# Patient Record
Sex: FEMALE | Race: BLACK OR AFRICAN AMERICAN | NOT HISPANIC OR LATINO | Employment: PART TIME | URBAN - METROPOLITAN AREA
[De-identification: names, ages, dates, MRNs, and addresses within clinical notes are randomized per-mention and may not be internally consistent; named-entity substitution may affect disease eponyms.]

---

## 2018-04-01 ENCOUNTER — APPOINTMENT (EMERGENCY)
Dept: RADIOLOGY | Facility: HOSPITAL | Age: 33
End: 2018-04-01
Payer: COMMERCIAL

## 2018-04-01 ENCOUNTER — HOSPITAL ENCOUNTER (EMERGENCY)
Facility: HOSPITAL | Age: 33
Discharge: HOME | End: 2018-04-01
Attending: EMERGENCY MEDICINE | Admitting: EMERGENCY MEDICINE
Payer: COMMERCIAL

## 2018-04-01 VITALS
WEIGHT: 105 LBS | RESPIRATION RATE: 16 BRPM | TEMPERATURE: 98.9 F | OXYGEN SATURATION: 99 % | DIASTOLIC BLOOD PRESSURE: 64 MMHG | HEART RATE: 83 BPM | SYSTOLIC BLOOD PRESSURE: 127 MMHG

## 2018-04-01 DIAGNOSIS — M54.6 THORACIC BACK PAIN: Primary | ICD-10-CM

## 2018-04-01 DIAGNOSIS — S80.12XA CONTUSION OF LEFT LOWER EXTREMITY, INITIAL ENCOUNTER: ICD-10-CM

## 2018-04-01 DIAGNOSIS — S40.811A ABRASION OF RIGHT UPPER EXTREMITY, INITIAL ENCOUNTER: ICD-10-CM

## 2018-04-01 DIAGNOSIS — S01.511A LIP LACERATION, INITIAL ENCOUNTER: ICD-10-CM

## 2018-04-01 LAB
BACTERIA UR QL AUTO: ABNORMAL /HPF
BILIRUB UR QL STRIP: ABNORMAL
CLARITY UR: CLEAR
COLOR UR: ABNORMAL
EXT PREG TEST URINE: NEGATIVE
GLUCOSE UR STRIP-MCNC: NEGATIVE MG/DL
HGB UR QL STRIP.AUTO: NEGATIVE
KETONES UR STRIP-MCNC: ABNORMAL MG/DL
LEUKOCYTE ESTERASE UR QL STRIP: NEGATIVE
MUCOUS THREADS UR QL AUTO: ABNORMAL
NITRITE UR QL STRIP: NEGATIVE
NON-SQ EPI CELLS URNS QL MICRO: ABNORMAL /HPF
PH UR STRIP.AUTO: 5.5 [PH] (ref 4.5–8)
PROT UR STRIP-MCNC: ABNORMAL MG/DL
RBC #/AREA URNS AUTO: ABNORMAL /HPF
SP GR UR STRIP.AUTO: 1.02 (ref 1–1.03)
UROBILINOGEN UR QL STRIP.AUTO: 1 E.U./DL
WBC #/AREA URNS AUTO: ABNORMAL /HPF

## 2018-04-01 PROCEDURE — 81025 URINE PREGNANCY TEST: CPT | Performed by: EMERGENCY MEDICINE

## 2018-04-01 PROCEDURE — 71101 X-RAY EXAM UNILAT RIBS/CHEST: CPT

## 2018-04-01 PROCEDURE — 99284 EMERGENCY DEPT VISIT MOD MDM: CPT

## 2018-04-01 PROCEDURE — 81001 URINALYSIS AUTO W/SCOPE: CPT

## 2018-04-01 PROCEDURE — 81002 URINALYSIS NONAUTO W/O SCOPE: CPT | Performed by: EMERGENCY MEDICINE

## 2018-04-01 PROCEDURE — 90715 TDAP VACCINE 7 YRS/> IM: CPT | Performed by: EMERGENCY MEDICINE

## 2018-04-01 PROCEDURE — 90471 IMMUNIZATION ADMIN: CPT

## 2018-04-01 RX ORDER — NAPROXEN 500 MG/1
500 TABLET ORAL 2 TIMES DAILY PRN
Qty: 14 TABLET | Refills: 0 | Status: SHIPPED | OUTPATIENT
Start: 2018-04-01 | End: 2018-07-25

## 2018-04-01 RX ORDER — NAPROXEN 250 MG/1
500 TABLET ORAL ONCE
Status: COMPLETED | OUTPATIENT
Start: 2018-04-01 | End: 2018-04-01

## 2018-04-01 RX ADMIN — TETANUS TOXOID, REDUCED DIPHTHERIA TOXOID AND ACELLULAR PERTUSSIS VACCINE, ADSORBED 0.5 ML: 5; 2.5; 8; 8; 2.5 SUSPENSION INTRAMUSCULAR at 20:50

## 2018-04-01 RX ADMIN — NAPROXEN 500 MG: 250 TABLET ORAL at 20:17
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
## 2018-04-02 NOTE — DISCHARGE INSTRUCTIONS
Back Pain   WHAT YOU NEED TO KNOW:   Back pain is common  It can be caused by many conditions, such as arthritis or the breakdown of spinal discs  Your risk for back pain is increased by injuries, lack of activity, or repeated bending and twisting  You may feel sore or stiff on one or both sides of your back  The pain may spread to your buttocks or thighs  DISCHARGE INSTRUCTIONS:   Medicines:   · NSAIDs  help decrease swelling and pain  This medicine is available with or without a doctor's order  NSAIDs can cause stomach bleeding or kidney problems in certain people  If you take blood thinner medicine, always ask your healthcare provider if NSAIDs are safe for you  Always read the medicine label and follow directions  · Acetaminophen  decreases pain  It is available without a doctor's order  Ask how much to take and how often to take it  Follow directions  Acetaminophen can cause liver damage if not taken correctly  · Prescription pain medicine  may be given  Ask your healthcare provider how to take this medicine safely  · Take your medicine as directed  Contact your healthcare provider if you think your medicine is not helping or if you have side effects  Tell him or her if you are allergic to any medicine  Keep a list of the medicines, vitamins, and herbs you take  Include the amounts, and when and why you take them  Bring the list or the pill bottles to follow-up visits  Carry your medicine list with you in case of an emergency  Follow up with your healthcare provider in 2 weeks, or as directed:  Write down your questions so you remember to ask them during your visits  How to manage your back pain:   · Apply ice  on your back or affected area for 15 to 20 minutes every hour or as directed  Use an ice pack, or put crushed ice in a plastic bag  Cover it with a towel  Ice helps prevent tissue damage and decreases pain      · Apply heat  on your back or affected area for 20 to 30 minutes every 2 hours for as many days as directed  Heat helps decrease pain and muscle spasms  · Stay active  as much as you can without causing more pain  Bed rest could make your back pain worse  Avoid heavy lifting until your pain is gone  Return to the emergency department if:   · You have pain, numbness, or weakness in one or both legs  · Your pain becomes so severe that you cannot walk  · You cannot control your urine or bowel movements  · You have severe back pain with chest pain  · You have severe back pain, nausea, and vomiting  · You have severe back pain that spreads to your side or genital area  Contact your healthcare provider if:   · You have back pain that does not get better with rest and pain medicine  · You have a fever  · You have pain that worsens when you are on your back or when you rest     · You have pain that worsens when you cough or sneeze  · You lose weight without trying  · You have questions or concerns about your condition or care  © 2017 [address] Information is for End User's use only and may not be sold, redistributed or otherwise used for commercial purposes  All illustrations and images included in CareNotes® are the copyrighted property of A D A M , Inc  or Nithin Alarcon  The above information is an  only  It is not intended as medical advice for individual conditions or treatments  Talk to your doctor, nurse or pharmacist before following any medical regimen to see if it is safe and effective for you  Abrasion   WHAT YOU NEED TO KNOW:   An abrasion is a scrape on your skin  It happens when your skin rubs against a rough surface  Some examples of an abrasion include rug burn, a skinned elbow, or road rash  Abrasions can be many shapes and sizes  The wound may hurt, bleed, bruise, or swell     DISCHARGE INSTRUCTIONS:   Return to the emergency department if:   · The bleeding does not stop after 10 minutes of firm pressure  · You cannot rinse one or more foreign objects out of your wound  · You have red streaks on your skin coming from your wound  Contact your healthcare provider if:   · You have a fever or chills  · Your abrasion is red, warm, swollen, or draining pus  · You have questions or concerns about your condition or care  Care for your abrasion:   · Wash your hands and dry them with a clean towel  · Press a clean cloth against your wound to stop any bleeding  · Rinse your wound with a lot of clean water  Do not use harsh soap, alcohol, or iodine solutions  · Use a clean, wet cloth to remove any objects, such as small pieces of rocks or dirt  · Rub antibiotic ointment on your wound  This may help prevent infection and help your wound heal     · Cover the wound with a non-stick bandage  Change the bandage daily, and if gets wet or dirty  Follow up with your healthcare provider as directed:  Write down your questions so you remember to ask them during your visits  © 2017 [facility] [facility] Information is for End User's use only and may not be sold, redistributed or otherwise used for commercial purposes  All illustrations and images included in CareNotes® are the copyrighted property of A D A M , Inc  or [employer]  The above information is an  only  It is not intended as medical advice for individual conditions or treatments  Talk to your doctor, nurse or pharmacist before following any medical regimen to see if it is safe and effective for you  Contusion in Adults   WHAT YOU NEED TO KNOW:   A contusion is a bruise that appears on your skin after an injury  A bruise happens when small blood vessels tear but skin does not  When blood vessels tear, blood leaks into nearby tissue, such as soft tissue or muscle  DISCHARGE INSTRUCTIONS:   Return to the emergency department if:   · You have new trouble moving the injured area      · You have tingling or numbness in or near the injured area  · Your hand or foot below the bruise gets cold or turns pale  Contact your healthcare provider if:   · You find a new lump in the injured area  · Your symptoms do not improve with treatment after 4 to 5 days  · You have questions or concerns about your condition or care  Medicines: You may need any of the following:  · NSAIDs  help decrease swelling and pain or fever  This medicine is available with or without a doctor's order  NSAIDs can cause stomach bleeding or kidney problems in certain people  If you take blood thinner medicine, always ask your healthcare provider if NSAIDs are safe for you  Always read the medicine label and follow directions  · Prescription pain medicine  may be given  Do not wait until the pain is severe before you take your medicine  · Take your medicine as directed  Contact your healthcare provider if you think your medicine is not helping or if you have side effects  Tell him of her if you are allergic to any medicine  Keep a list of the medicines, vitamins, and herbs you take  Include the amounts, and when and why you take them  Bring the list or the pill bottles to follow-up visits  Carry your medicine list with you in case of an emergency  Follow up with your healthcare provider as directed: You may need to return within a week to check your injury again  Write down your questions so you remember to ask them during your visits  Help a contusion heal:   · Rest the injured area  or use it less than usual  If you bruised your leg or foot, you may need crutches or a cane to help you walk  This will help you keep weight off your injured body part  · Apply ice  to decrease swelling and pain  Ice may also help prevent tissue damage  Use an ice pack, or put crushed ice in a plastic bag  Cover it with a towel and place it on your bruise for 15 to 20 minutes every hour or as directed      · Use compression  to support the area and decrease swelling  Wrap an elastic bandage around the area over the bruised muscle  Make sure the bandage is not too tight  You should be able to fit 1 finger between the bandage and your skin  · Elevate (raise) your injured body part  above the level of your heart to help decrease pain and swelling  Use pillows, blankets, or rolled towels to elevate the area as often as you can  · Do not drink alcohol  as directed  Alcohol may slow healing  · Do not stretch injured muscles  right after your injury  Ask your healthcare provider when and how you may safely stretch after your injury  Gentle stretches can help increase your flexibility  · Do not massage the area or put heating pads  on the bruise right after your injury  Heat and massage may slow healing  Your healthcare provider may tell you to apply heat after several days  At that time, heat will start to help the injury heal   Prevent another contusion:   · Stretch and warm up before you play sports or exercise  · Wear protective gear when you play sports  Examples are shin guards and padding  · If you begin a new physical activity, start slowly to give your body a chance to adjust   © 2017 [address] [facility] Information is for End User's use only and may not be sold, redistributed or otherwise used for commercial purposes  All illustrations and images included in CareNotes® are the copyrighted property of A D A M , Inc  or [employer]  The above information is an  only  It is not intended as medical advice for individual conditions or treatments  Talk to your doctor, nurse or pharmacist before following any medical regimen to see if it is safe and effective for you

## 2018-07-25 ENCOUNTER — HOSPITAL ENCOUNTER (INPATIENT)
Facility: HOSPITAL | Age: 33
LOS: 6 days | Discharge: HOME | DRG: 751 | End: 2018-07-31
Attending: PSYCHIATRY & NEUROLOGY | Admitting: PSYCHIATRY & NEUROLOGY
Payer: COMMERCIAL

## 2018-07-25 ENCOUNTER — HOSPITAL ENCOUNTER (EMERGENCY)
Facility: HOSPITAL | Age: 33
End: 2018-07-25
Attending: EMERGENCY MEDICINE | Admitting: EMERGENCY MEDICINE
Payer: COMMERCIAL

## 2018-07-25 VITALS
DIASTOLIC BLOOD PRESSURE: 79 MMHG | RESPIRATION RATE: 16 BRPM | SYSTOLIC BLOOD PRESSURE: 125 MMHG | HEART RATE: 86 BPM | TEMPERATURE: 97 F | OXYGEN SATURATION: 99 %

## 2018-07-25 DIAGNOSIS — Z00.8 MEDICAL CLEARANCE FOR PSYCHIATRIC ADMISSION: Primary | ICD-10-CM

## 2018-07-25 DIAGNOSIS — F33.2 SEVERE EPISODE OF RECURRENT MAJOR DEPRESSIVE DISORDER, WITHOUT PSYCHOTIC FEATURES (HCC): Primary | ICD-10-CM

## 2018-07-25 DIAGNOSIS — F41.1 GENERALIZED ANXIETY DISORDER: ICD-10-CM

## 2018-07-25 DIAGNOSIS — F31.9 BIPOLAR DISORDER (HCC): Primary | ICD-10-CM

## 2018-07-25 DIAGNOSIS — Z00.8 MEDICAL CLEARANCE FOR PSYCHIATRIC ADMISSION: ICD-10-CM

## 2018-07-25 LAB
AMPHETAMINES SERPL QL SCN: NEGATIVE
BARBITURATES UR QL: NEGATIVE
BENZODIAZ UR QL: NEGATIVE
COCAINE UR QL: POSITIVE
ETHANOL EXG-MCNC: 0.01 MG/DL
EXT PREG TEST URINE: NEGATIVE
METHADONE UR QL: NEGATIVE
OPIATES UR QL SCN: NEGATIVE
PCP UR QL: NEGATIVE
THC UR QL: POSITIVE

## 2018-07-25 PROCEDURE — 80307 DRUG TEST PRSMV CHEM ANLYZR: CPT

## 2018-07-25 PROCEDURE — 82075 ASSAY OF BREATH ETHANOL: CPT

## 2018-07-25 PROCEDURE — 81025 URINE PREGNANCY TEST: CPT

## 2018-07-25 PROCEDURE — 99285 EMERGENCY DEPT VISIT HI MDM: CPT

## 2018-07-25 RX ORDER — MAGNESIUM HYDROXIDE/ALUMINUM HYDROXICE/SIMETHICONE 120; 1200; 1200 MG/30ML; MG/30ML; MG/30ML
30 SUSPENSION ORAL EVERY 4 HOURS PRN
Status: CANCELLED | OUTPATIENT
Start: 2018-07-25 | End: 2019-07-25

## 2018-07-25 RX ORDER — IBUPROFEN 400 MG/1
800 TABLET ORAL EVERY 8 HOURS PRN
Status: CANCELLED | OUTPATIENT
Start: 2018-07-25 | End: 2019-07-25

## 2018-07-25 RX ORDER — LORAZEPAM 1 MG/1
1 TABLET ORAL ONCE
Status: COMPLETED | OUTPATIENT
Start: 2018-07-25 | End: 2018-07-25

## 2018-07-25 RX ORDER — ACETAMINOPHEN 325 MG/1
650 TABLET ORAL EVERY 6 HOURS PRN
Status: CANCELLED | OUTPATIENT
Start: 2018-07-25 | End: 2019-07-25

## 2018-07-25 RX ORDER — TRAZODONE HYDROCHLORIDE 50 MG/1
50 TABLET ORAL
Status: CANCELLED | OUTPATIENT
Start: 2018-07-25 | End: 2019-07-25

## 2018-07-25 RX ORDER — BENZTROPINE MESYLATE 1 MG/ML
1 INJECTION INTRAMUSCULAR; INTRAVENOUS EVERY 6 HOURS PRN
Status: CANCELLED | OUTPATIENT
Start: 2018-07-25 | End: 2019-07-25

## 2018-07-25 RX ORDER — IBUPROFEN 600 MG/1
600 TABLET ORAL EVERY 8 HOURS PRN
Status: CANCELLED | OUTPATIENT
Start: 2018-07-25 | End: 2019-07-25

## 2018-07-25 RX ORDER — RISPERIDONE 1 MG/1
1 TABLET, ORALLY DISINTEGRATING ORAL EVERY 6 HOURS PRN
Status: CANCELLED | OUTPATIENT
Start: 2018-07-25 | End: 2019-07-25

## 2018-07-25 RX ORDER — HYDROXYZINE HYDROCHLORIDE 25 MG/1
25 TABLET, FILM COATED ORAL EVERY 6 HOURS PRN
Status: CANCELLED | OUTPATIENT
Start: 2018-07-25 | End: 2019-07-25

## 2018-07-25 RX ORDER — BENZTROPINE MESYLATE 1 MG/1
1 TABLET ORAL EVERY 6 HOURS PRN
Status: CANCELLED | OUTPATIENT
Start: 2018-07-25 | End: 2019-07-25

## 2018-07-25 RX ADMIN — LORAZEPAM 1 MG: 1 TABLET ORAL at 15:54

## 2018-07-25 RX ADMIN — LORAZEPAM 1 MG: 1 TABLET ORAL at 20:45

## 2018-07-25 NOTE — LETTER
[facility]  [address] [address]  Dept: [phone]      EMTALA TRANSFER CONSENT    NAME Amauri Li                                         1985                              MRN [id number]    I have been informed of my rights regarding examination, treatment, and transfer   by Dr Saurav Hogan DO    Benefits: Specialized equipment and/or services available at the receiving facility (Include comment)________________________    Risks: Potential for delay in receiving treatment, Increased discomfort during transfer      Consent for Transfer:  I acknowledge that my medical condition has been evaluated and explained to me by the emergency department physician or other qualified medical person and/or my attending physician, who has recommended that I be transferred to the service of  Accepting Physician: DR Becky Mejía at [address] [place] [address] Name, [address] : Adri Bingham ([address] [place] [address]  The above potential benefits of such transfer, the potential risks associated with such transfer, and the probable risks of not being transferred have been explained to me, and I fully understand them  The doctor has explained that, in my case, the benefits of transfer outweigh the risks  I agree to be transferred  I authorize the performance of emergency medical procedures and treatments upon me in both transit and upon arrival at the receiving facility  Additionally, I authorize the release of any and all medical records to the receiving facility and request they be transported with me, if possible  I understand that the safest mode of transportation during a medical emergency is an ambulance and that the Hospital advocates the use of this mode of transport   Risks of traveling to the receiving facility by car, including absence of medical control, life sustaining equipment, such as oxygen, and medical personnel has been explained to me and I fully understand them  (MAYA CORRECT BOX BELOW)  Royer Epp ]  I consent to the stated transfer and to be transported by ambulance/helicopter  [  ]  I consent to the stated transfer, but refuse transportation by ambulance and accept full responsibility for my transportation by car  I understand the risks of non-ambulance transfers and I exonerate the Hospital and its staff from any deterioration in my condition that results from this refusal     X___________________________________________    DATE  18  TIME________  Signature of patient or legally responsible individual signing on patient behalf           RELATIONSHIP TO PATIENT_________________________          Provider Certification    NAME Traci Restrepo                                        TRESA 1985                              MRN [id number]    A medical screening exam was performed on the above named patient  Based on the examination:    Condition Necessitating Transfer The encounter diagnosis was Bipolar disorder ([address] )  Patient Condition: The patient has been stabilized such that within reasonable medical probability, no material deterioration of the patient condition or the condition of the unborn child(rishabh) is likely to result from the transfer    Reason for Transfer: Level of Care needed not available at this facility    Transfer Requirements: [facility] ([address]   · Space available and qualified personnel available for treatment as acknowledged by Joyce Mcrae ([facility]) [phone]  · Agreed to accept transfer and to provide appropriate medical treatment as acknowledged by       DR Wan Soliz  · Appropriate medical records of the examination and treatment of the patient are provided at the time of transfer   [facility] [address] _______  · Transfer will be performed by qualified personnel from [address]  and appropriate transfer equipment as required, including the use of necessary and appropriate life support measures  Provider Certification: I have examined the patient and explained the following risks and benefits of being transferred/refusing transfer to the patient/family:  General risk, such as traffic hazards, adverse weather conditions, rough terrain or turbulence, possible failure of equipment (including vehicle or aircraft), or consequences of actions of persons outside the control of the transport personnel      Based on these reasonable risks and benefits to the patient and/or the unborn child(rishabh), and based upon the information available at the time of the patients examination, I certify that the medical benefits reasonably to be expected from the provision of appropriate medical treatments at another medical facility outweigh the increasing risks, if any, to the individuals medical condition, and in the case of labor to the unborn child, from effecting the transfer      X____________________________________________ DATE 07/25/18        TIME_______      ORIGINAL - SEND TO MEDICAL RECORDS   COPY - SEND WITH PATIENT DURING TRANSFER

## 2018-07-25 NOTE — LETTER
Section I - General Information    Name of Patient: Forrest Slaughter                 : 1985    Medicare #:____________________  Transport Date: 18 (PCS is valid for round trips on this date and for all repetitive trips in the 60-day range as noted below )  Origin: [place] [id number]                                                         [facility] ________________________________________________  Is the pt's stay covered under Medicare Part A (PPS/DRG)     (_) YES  (X) NO  Closest appropriate facility? (X) YES  (_) NO  If no, why is transport to more distant facility required?________________________  If hosp-hosp transfer, describe services needed at 2nd facility not available at 1st facility? _________________________________  If hospice pt, is this transport related to pt's terminal illness? (_) YES (X) NO Describe____________________________________    Section II - Medical Necessity Questionnaire  Ambulance transportation is medically necessary only if other means of transport are contraindicated or would be potentially harmful to the patient  To meet this requirement, the patient must either be "bed confined" or suffer from a condition such that transport by means other than ambulance is contraindicated by the patient's condition   The following questions must be answered by the medical professional signing below for this form to be valid:    1)  Describe the MEDICAL CONDITION (physical and/or mental) of this patient AT [address] [place] [address] that requires the patient to be transported in an ambulance and why transport by other means is contraindicated by the patient's condition:__________________________________________________________________________________________________    2) Is the patient "bed confined" as defined below?     (_) YES  (X) NO  To be "be confined" the patient must satisfy all three of the following conditions: (1) unable to get up from bed without Assistance; AND (2) unable to ambulate; AND (3) unable to sit in a chair or wheelchair  3) Can this patient safely be transported by car or wheelchair Fredie Able (i e , seated during transport without a medical attendant or monitoring)?   (_) YES  (X) NO    4) In addition to completing questions 1-3 above, please check any of the following conditions that apply*:  *Note: supporting documentation for any boxes checked must be maintained in the patient's medical records  (_)Contractures   (_)Non-Healed Fractures  (_)Patient is confused (_)Patient is comatose (_)Moderate/severe pain on movement (X)Danger to self/others  (_)IV meds/fluids required (_)Patient is combative(_)Need or possible need for restraints (_)DVT requires elevation of lower extremity  (_)Medical attendant required (_)Requires oxygen-unable to self administer (_)Special handling/isolation/infection control precautions required (_)Unable to tolerate seated position for time needed to transport (_)Hemodynamic monitoring required en route (_)Unable to sit in a chair or wheelchair due to decubitus ulcers or other wounds (_)Cardiac monitoring required en route (_)Morbid obesity requires additional personnel/equipment to safely handle patient (_)Orthopedic device (backboard, halo, pins, traction, brace, wedge, etc,) requiring special handling during transport (_)Other(specify)_______________________________________________    Section III - Signature of Physician or Healthcare Professional  I certify that the above information is true and correct based on my evaluation of this patient, and represent that the patient requires transport by ambulance and that other forms of transport are contraindicated   I understand that this information will be used by the Centers for Medicare and Medicaid Services (CMS) to support the determination of medical necessity for ambulance services, and I represent that I have personal knowledge of the patient's condition at time of transport  (_) If this box is checked, I also certify that the patient is physically or mentally incapable of signing the ambulance service's claim and that the institution with which I am affiliated has furnished care, services, or assistance to the patient  My signature below is made on behalf of the patient pursuant to 42 CFR §424 36(b)(4)  In accordance with 42 CFR §424 37, the specific reason(s) that the patient is physically or mentally incapable of signing the claim form is as follows: _________________________________________________________________________________________________________      Signature of Physician* or Healthcare Professional______________________________________________________________  Signature Date 07/25/18 (For scheduled repetitive transports, this form is not valid for transports performed more than 60 days after this date)    Printed Name & Credentials of Physician or Healthcare Professional (MD, DO, RN, etc )________________________________  *Form must be signed by patient's attending physician for scheduled, repetitive transports   For non-repetitive, unscheduled ambulance transports, if unable to obtain the signature of the attending physician, any of the following may sign (choose appropriate option below)  (_) Physician Assistant (_)  Clinical Nurse Specialist (_)  Registered Nurse  (_)  Nurse Practitioner  (X) Discharge Planner

## 2018-07-25 NOTE — ED PROVIDER NOTES
History  Chief Complaint   Patient presents with    Psychiatric Evaluation     Reports history of depression and bipolar  Off medications for approx 5 years  Increased depression  Denies AH or VH  Reports SI with no plan  Denies HI but states she has been having a hard time controlling her anger "and I feel like I wanna take it to the next leve"  Calm and cooperative in triage but tearful  Pt  Has a hx of bipolar disorder , she's been off of meds for 5 years now  Lately she's felt more depressed, not sleeping, not eating  She had a breakup with her boyfriend and moved out 2 weeks ago  She drinks alcohol and smokes weed often - last drink was last night   +suicidal -no definite plan  She said that she's tried to overdose in the past              Prior to Admission Medications   Prescriptions Last Dose Informant Patient Reported? Taking?   naproxen (NAPROSYN) 500 mg tablet   No No   Sig: Take 1 tablet (500 mg total) by mouth 2 (two) times a day as needed for moderate pain for up to 7 days      Facility-Administered Medications: None       Past Medical History:   Diagnosis Date    Anxiety     B[facility] disorder ([facility] )     Depression        Past Surgical History:   Procedure Laterality Date     SECTION      ECTOPIC PREGNANCY SURGERY      ECTOPIC PREGNANCY SURGERY Left        History reviewed  No pertinent family history  I have reviewed and agree with the history as documented  Social History   Substance Use Topics    Smoking status: Current Every Day Smoker     Packs/day: 0 25     Types: Cigarettes    Smokeless tobacco: Never Used    Alcohol use Yes      Comment: reports increased ETOH use        Review of Systems   Constitutional: Negative for appetite change, fatigue and fever  HENT: Negative for sore throat  Respiratory: Negative for cough, shortness of breath and wheezing  Cardiovascular: Negative for chest pain and leg swelling     Gastrointestinal: Negative for abdominal pain, diarrhea and vomiting  Genitourinary: Negative for dysuria and flank pain  Musculoskeletal: Negative for back pain and neck pain  Skin: Negative for rash  Neurological: Negative for syncope, weakness and headaches  Psychiatric/Behavioral:        +SI , no definite plan       Physical Exam  Physical Exam   Constitutional: She is oriented to person, place, and time  She appears well-developed and well-nourished  HENT:   Head: Normocephalic and atraumatic  Neck: Normal range of motion  Neck supple  Cardiovascular: Normal rate and regular rhythm  Pulmonary/Chest: Effort normal and breath sounds normal    Abdominal: Soft  There is no tenderness  Musculoskeletal: Normal range of motion  Neurological: She is alert and oriented to person, place, and time  Skin: Skin is warm and dry  Psychiatric:   +SI without a definite plan  No HI  Pt  States that she feels very angry  +anxious  Nursing note and vitals reviewed  Vital Signs  ED Triage Vitals [07/25/18 1423]   Temperature Pulse Respirations Blood Pressure SpO2   (!) 97 °F (36 1 °C) 82 17 (!) 176/96 97 %      Temp src Heart Rate Source Patient Position - Orthostatic VS BP Location FiO2 (%)   -- -- Sitting -- --      Pain Score       No Pain           Vitals:    07/25/18 1423   BP: (!) 176/96   Pulse: 82   Patient Position - Orthostatic VS: Sitting       Visual Acuity      ED Medications  Medications   LORazepam (ATIVAN) tablet 1 mg (not administered)       Diagnostic Studies  Results Reviewed     Procedure Component Value Units Date/Time    Rapid drug screen, urine [[id number]] Collected:  07/25/18 1525    Lab Status:   In process Specimen:  Urine from Urine, Clean Catch Updated:  07/25/18 1527    POCT pregnancy, urine [[id number]]  (Normal) Resulted:  07/25/18 1525    Lab Status:  Final result Specimen:  Urine Updated:  07/25/18 1525     EXT PREG TEST UR (Ref: Negative) NEGATIVE    POCT alcohol breath test [[id number]]  (Normal) Resulted: 07/25/18 1523    Lab Status:  Final result Updated:  07/25/18 1523     EXTBreath Alcohol 0 01                 No orders to display              Procedures  Procedures       Phone Contacts  ED Phone Contact    ED Course                               MDM  Number of Diagnoses or Management Options  Risk of Complications, Morbidity, and/or Mortality  Presenting problems: moderate      CritCare Time    Disposition  Final diagnoses:   Bipolar disorder ([facility] [address] )     Time reflects when diagnosis was documented in both MDM as applicable and the Disposition within this note     Time User Action Codes Description Comment    7/25/2018  3:40 PM Jake Pierce Add [F31 9] Bipolar disorder [facility])       ED Disposition     None      Follow-up Information    None         Patient's Medications   Discharge Prescriptions    No medications on file     No discharge procedures on file      ED Provider  Electronically Signed by           Kell Moreira MD  07/25/18 2223

## 2018-07-25 NOTE — ED NOTES
Patient reports she is not currently taking any prescribed medication        Lehman Hamman, RN  07/25/18 9858

## 2018-07-26 PROBLEM — F10.90 ALCOHOL USE DISORDER: Status: ACTIVE | Noted: 2018-07-26

## 2018-07-26 PROBLEM — F41.1 GENERALIZED ANXIETY DISORDER: Status: ACTIVE | Noted: 2018-07-26

## 2018-07-26 PROBLEM — F12.10 CANNABIS ABUSE: Status: ACTIVE | Noted: 2018-07-26

## 2018-07-26 PROBLEM — IMO0002 ALCOHOL USE DISORDER: Status: ACTIVE | Noted: 2018-07-26

## 2018-07-26 PROBLEM — F33.2 SEVERE EPISODE OF RECURRENT MAJOR DEPRESSIVE DISORDER, WITHOUT PSYCHOTIC FEATURES (HCC): Status: ACTIVE | Noted: 2018-07-26

## 2018-07-26 LAB
AMPHETAMINES SERPL QL SCN: NEGATIVE
BARBITURATES UR QL: NEGATIVE
BENZODIAZ UR QL: NEGATIVE
COCAINE UR QL: POSITIVE
METHADONE UR QL: NEGATIVE
OPIATES UR QL SCN: NEGATIVE
PCP UR QL: NEGATIVE
THC UR QL: POSITIVE

## 2018-07-26 PROCEDURE — 80307 DRUG TEST PRSMV CHEM ANLYZR: CPT | Performed by: PSYCHIATRY & NEUROLOGY

## 2018-07-26 PROCEDURE — 99223 1ST HOSP IP/OBS HIGH 75: CPT | Performed by: PSYCHIATRY & NEUROLOGY

## 2018-07-26 PROCEDURE — 99251 PR INITL INPATIENT CONSULT NEW/ESTAB PT 20 MIN: CPT | Performed by: PHYSICIAN ASSISTANT

## 2018-07-26 RX ORDER — LORAZEPAM 0.5 MG/1
0.5 TABLET ORAL 2 TIMES DAILY
Status: DISCONTINUED | OUTPATIENT
Start: 2018-07-26 | End: 2018-07-31 | Stop reason: HOSPADM

## 2018-07-26 RX ORDER — MIRTAZAPINE 15 MG/1
15 TABLET, FILM COATED ORAL
Status: DISCONTINUED | OUTPATIENT
Start: 2018-07-26 | End: 2018-07-27

## 2018-07-26 RX ORDER — IBUPROFEN 600 MG/1
600 TABLET ORAL EVERY 8 HOURS PRN
Status: DISCONTINUED | OUTPATIENT
Start: 2018-07-26 | End: 2018-07-31 | Stop reason: HOSPADM

## 2018-07-26 RX ORDER — IBUPROFEN 800 MG/1
800 TABLET ORAL EVERY 8 HOURS PRN
Status: DISCONTINUED | OUTPATIENT
Start: 2018-07-26 | End: 2018-07-31 | Stop reason: HOSPADM

## 2018-07-26 RX ORDER — HYDROXYZINE HYDROCHLORIDE 25 MG/1
25 TABLET, FILM COATED ORAL EVERY 6 HOURS PRN
Status: DISCONTINUED | OUTPATIENT
Start: 2018-07-26 | End: 2018-07-31 | Stop reason: HOSPADM

## 2018-07-26 RX ORDER — MAGNESIUM HYDROXIDE/ALUMINUM HYDROXICE/SIMETHICONE 120; 1200; 1200 MG/30ML; MG/30ML; MG/30ML
30 SUSPENSION ORAL EVERY 4 HOURS PRN
Status: DISCONTINUED | OUTPATIENT
Start: 2018-07-26 | End: 2018-07-31 | Stop reason: HOSPADM

## 2018-07-26 RX ORDER — TRAZODONE HYDROCHLORIDE 50 MG/1
50 TABLET ORAL
Status: DISCONTINUED | OUTPATIENT
Start: 2018-07-26 | End: 2018-07-31 | Stop reason: HOSPADM

## 2018-07-26 RX ORDER — ACETAMINOPHEN 325 MG/1
650 TABLET ORAL EVERY 6 HOURS PRN
Status: DISCONTINUED | OUTPATIENT
Start: 2018-07-26 | End: 2018-07-31 | Stop reason: HOSPADM

## 2018-07-26 RX ORDER — BENZTROPINE MESYLATE 1 MG/ML
1 INJECTION INTRAMUSCULAR; INTRAVENOUS EVERY 6 HOURS PRN
Status: DISCONTINUED | OUTPATIENT
Start: 2018-07-26 | End: 2018-07-31 | Stop reason: HOSPADM

## 2018-07-26 RX ORDER — BENZTROPINE MESYLATE 1 MG/1
1 TABLET ORAL EVERY 6 HOURS PRN
Status: DISCONTINUED | OUTPATIENT
Start: 2018-07-26 | End: 2018-07-31 | Stop reason: HOSPADM

## 2018-07-26 RX ORDER — RISPERIDONE 1 MG/1
1 TABLET, ORALLY DISINTEGRATING ORAL EVERY 6 HOURS PRN
Status: DISCONTINUED | OUTPATIENT
Start: 2018-07-26 | End: 2018-07-31 | Stop reason: HOSPADM

## 2018-07-26 RX ADMIN — LORAZEPAM 0.5 MG: 0.5 TABLET ORAL at 10:52

## 2018-07-26 RX ADMIN — LORAZEPAM 0.5 MG: 0.5 TABLET ORAL at 17:18

## 2018-07-26 RX ADMIN — MIRTAZAPINE 15 MG: 15 TABLET, FILM COATED ORAL at 21:15

## 2018-07-26 RX ADMIN — TRAZODONE HYDROCHLORIDE 50 MG: 50 TABLET ORAL at 00:17

## 2018-07-26 NOTE — ED NOTES
CW EVS'd Pt and per EVS pt has [facility] as her primary insurance      [address] [place] [address]
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
## 2018-07-26 NOTE — ED NOTES
CW started bed search  CW called [facility] to inquire about [facility] bed status, [facility] informed me that pt clinical can be presented to [facility] for possible admission, 201 has been faxed        [address] [place] [address]

## 2018-07-26 NOTE — ED NOTES
Pt personal belongings and paperwork provided to EMS crew and pt prepared for transport to [facility]        Katerina Negron RN  07/25/18 6408

## 2018-07-26 NOTE — ED NOTES
Pt [employer] credit card provided to [facility] from pts locker with personal belongings per pt request       Pt requesting more ginger ale and "something for her nerves", will notify primary provider        Mauro Gonzales RN  07/25/18 2033
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
## 2018-07-26 NOTE — ED NOTES
Patient is accepted at [address] [facility] ([facility])  Patient is accepted by Dr ROBBI Wiley  per Dayna Carpenter (CW)    Transportation is arranged with [facility] is scheduled for 07/25/2018 @1909   Patient may go to the floor at [address] [facility] (2[facility]ST)         Nurse report is to be called to [phone] prior to patient transfer      [address] [place] [address]
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
## 2018-07-26 NOTE — PLAN OF CARE
Problem: SELF HARM/SUICIDALITY  Goal: Will have no self-injury during hospital stay  INTERVENTIONS:  - Q 15 MINUTES: Routine safety checks  - Q WAKING SHIFT & PRN: Assess risk to determine if routine checks are adequate to maintain patient safety  - Encourage patient to participate actively in care by formulating a plan to combat response to suicidal ideation, identify supports and resources   Outcome: Progressing      Problem: DEPRESSION  Goal: Will be euthymic at discharge  INTERVENTIONS:  - Administer medication as ordered  - Provide emotional support via 1:1 interaction with staff  - Encourage involvement in milieu/groups/activities  - Monitor for social isolation   Outcome: Progressing      Problem: ANXIETY  Goal: Will report anxiety at manageable levels  INTERVENTIONS:  - Administer medication as ordered  - Teach and encourage coping skills  - Provide emotional support  - Assess patient/family for anxiety and ability to cope   Outcome: Progressing    Goal: By discharge: Patient will verbalize 2 strategies to deal with anxiety  Interventions:  - Identify any obvious source/trigger to anxiety  - Staff will assist patient in applying identified coping technique/skills  - Encourage attendance of scheduled groups and activities   Outcome: Progressing      Problem: SUBSTANCE USE/ABUSE  Goal: Will have no detox symptoms and will verbalize plan for changing substance-related behavior  INTERVENTIONS:  - Monitor physical status and assess for symptoms of withdrawal  - Administer medication as ordered  - Provide emotional support with 1 on 1 interaction with staff  - Encourage recovery focused program/ addiction education  - Assess for verbalization of changing behaviors related to substance abuse  - Initiate consults and referrals as appropriate (Case Management, Spiritual Care, etc )   Outcome: Progressing    Goal: By discharge, will develop insight into their chemical dependency and sustain motivation to continue in recovery  INTERVENTIONS:  - Attends all daily group sessions and scheduled AA groups  - Actively practices coping skills through participation in the therapeutic community and adherence to program rules  - Reviews and completes assignments from individual treatment plan  - Assist patient development of understanding of their personal cycle of addiction and relapse triggers   Outcome: Progressing    Goal: By discharge, patient will have ongoing treatment plan addressing chemical dependency  INTERVENTIONS:  - Assist patient with resources and/or appointments for ongoing recovery based living   Outcome: Progressing      Problem: SLEEP DISTURBANCE  Goal: Will exhibit normal sleeping pattern  Interventions:  -  Assess the patients sleep pattern, noting recent changes  - Administer medication as ordered  - Decrease environmental stimuli, including noise, as appropriate during the night  - Encourage the patient to actively participate in unit groups and or exercise during the day to enhance ability to achieve adequate sleep at night  - Assess the patient, in the morning, encouraging a description of sleep experience   Outcome: Progressing      Problem: DISCHARGE PLANNING  Goal: Discharge to home or other facility with appropriate resources  INTERVENTIONS:  - Identify barriers to discharge w/patient and caregiver  - Arrange for needed discharge resources and transportation as appropriate  - Identify discharge learning needs (meds, wound care, etc )  - Arrange for interpretive services to assist at discharge as needed  - Refer to Case Management Department for coordinating discharge planning if the patient needs post-hospital services based on physician/advanced practitioner order or complex needs related to functional status, cognitive ability, or social support system   Outcome: Progressing      Problem: Ineffective Coping  Goal: Participates in unit activities  Interventions:  - Provide therapeutic environment   - Provide required programming   - Redirect inappropriate behaviors    Outcome: Progressing      Problem: Risk for Violence/Aggression Toward Others  Goal: Control angry outbursts  Interventions:  - Monitor patient closely, per order  - Ensure early verbal de-escalation  - Monitor prn medication needs  - Set reasonable/therapeutic limits, outline behavioral expectations, and consequences   - Provide a non-threatening milieu, utilizing the least restrictive interventions    Outcome: Progressing    Goal: Identify appropriate positive anger management techniques  Interventions:  - Offer anger management and coping skills groups   - Staff will provide positive feedback for appropriate anger control   Outcome: Progressing

## 2018-07-26 NOTE — TREATMENT PLAN
TREATMENT PLAN REVIEW - [address] 26 y o  1985 female MRN: [id number]    [address] [place] [address] [facility] [address] Encounter: [id number]          Admit Date/Time:  7/25/2018 11:54 PM    Treatment Team: Attending Provider: Otoniel Malcolm; Consulting Physician: Tiffany Ralph MD; Patient Care Technician: Cesia Celis;  Patient Care Technician: Lane Gutierrez; Medications RN: Gabriela Hagan RN; Occupational Therapy Assistant: CAROL Jaimes; : Lianne Marquez; Registered Nurse: Evan Oseguera RN    Diagnosis: Principal Problem:    Severe episode of recurrent major depressive disorder, without psychotic features ([facility] )  Active Problems:    Cannabis abuse    Generalized anxiety disorder    Alcohol use disorder ([facility] )    Patient Strengths/Assets: cooperative, good past treatment response, motivation for treatment/growth, patient is on a voluntary commitment    Patient Barriers/Limitations: limited support system, low self esteem, substance abuse    Short Term Goals: decrease in depressive symptoms, decrease in anxiety symptoms, decrease in suicidal thoughts    Long Term Goals: improvement in depression, improvement in anxiety, stabilization of mood, free of suicidal thoughts, acceptance of need for psychiatric medications, acceptance of need for psychiatric treatment, acceptance of need for psychiatric follow up after discharge    Progress Towards Goals: starting psychiatric medications as prescribed    Recommended Treatment: medication management, patient medication education, group therapy, milieu therapy, continued Behavioral Health psychiatric evaluation/assessment process    Treatment Frequency: daily medication monitoring, group and milieu therapy daily, monitoring through interdisciplinary rounds, monitoring through weekly patient care conferences    Expected Discharge Date: 5-7 days    Discharge Plan: referral for outpatient medication management with a psychiatrist, referral for outpatient psychotherapy    Treatment Plan Created/Updated By: Irish Nicole

## 2018-07-26 NOTE — ED NOTES
Pt is a 26 y o  female who was brought to the ED with   Chief Complaint   Patient presents with    Psychiatric Evaluation     Reports history of depression and bipolar  Off medications for approx 5 years  Increased depression  Denies AH or VH  Reports SI with no plan  Denies HI but states she has been having a hard time controlling her anger "and I feel like I wanna take it to the next leve"  Calm and cooperative in triage but tearful  Pt brought to the ED with complaints of increased depression for the month, pt reports S/I with no stated plan, pt reports that her stressors are her current living situtation, past relationships, and people in her life  Pt reports that she has not been on any psych meds in 5 yrs (since her last admission to in patient psych) Pt reports that she feels hopeless and helpless  Pt admits to S/I   Pt denies H/I,A/H,,V/H  Rosella Goldmann Intake Assessment completed, Safety risk Assessment completed  CW met with pt and discussed the process of a BHU admission, pt has agreed and signed a 201  CW discussed this case and pt plan with ED Physician who is in agreement with this plan  CW will start bed search, and complete Pre-Cert        [address] [facility] [address] Worker

## 2018-07-26 NOTE — ED NOTES
Insurance Authorization:   Phone call placed to [facility]   Phone number: [phone]  Spoke to Mauro Dietrich (care manager)  2 days approved    Level of care: IP   Authorization # Call upon [address]   Crisis Worker

## 2018-07-26 NOTE — PROGRESS NOTES
Belongings at bedside-   1 shirt, 1 blistex    Belongings in locker-   1 sweatshirt with strings, 1 pants with strings, sneakers, insurance card, access card and 2 drivers licenses, pen, earrings, keys, nail clippers, tweezers, cigarettes (7), 2 cell phones
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
## 2018-07-27 LAB
ALBUMIN SERPL BCP-MCNC: 3.1 G/DL (ref 3.5–5)
ALP SERPL-CCNC: 81 U/L (ref 46–116)
ALT SERPL W P-5'-P-CCNC: 23 U/L (ref 12–78)
ANION GAP SERPL CALCULATED.3IONS-SCNC: 5 MMOL/L (ref 4–13)
AST SERPL W P-5'-P-CCNC: 18 U/L (ref 5–45)
BASOPHILS # BLD AUTO: 0.07 THOUSANDS/ΜL (ref 0–0.1)
BASOPHILS NFR BLD AUTO: 1 % (ref 0–1)
BILIRUB SERPL-MCNC: 0.3 MG/DL (ref 0.2–1)
BUN SERPL-MCNC: 9 MG/DL (ref 5–25)
CALCIUM SERPL-MCNC: 8.6 MG/DL (ref 8.3–10.1)
CHLORIDE SERPL-SCNC: 105 MMOL/L (ref 100–108)
CHOLEST SERPL-MCNC: 132 MG/DL (ref 50–200)
CO2 SERPL-SCNC: 30 MMOL/L (ref 21–32)
CREAT SERPL-MCNC: 0.88 MG/DL (ref 0.6–1.3)
EOSINOPHIL # BLD AUTO: 0.08 THOUSAND/ΜL (ref 0–0.61)
EOSINOPHIL NFR BLD AUTO: 1 % (ref 0–6)
ERYTHROCYTE [DISTWIDTH] IN BLOOD BY AUTOMATED COUNT: 13.7 % (ref 11.6–15.1)
GFR SERPL CREATININE-BSD FRML MDRD: 98 ML/MIN/1.73SQ M
GLUCOSE P FAST SERPL-MCNC: 81 MG/DL (ref 65–99)
GLUCOSE SERPL-MCNC: 81 MG/DL (ref 65–140)
HCT VFR BLD AUTO: 39.1 % (ref 34.8–46.1)
HDLC SERPL-MCNC: 73 MG/DL (ref 40–60)
HGB BLD-MCNC: 12.6 G/DL (ref 11.5–15.4)
IMM GRANULOCYTES # BLD AUTO: 0.02 THOUSAND/UL (ref 0–0.2)
IMM GRANULOCYTES NFR BLD AUTO: 0 % (ref 0–2)
LDLC SERPL CALC-MCNC: 49 MG/DL (ref 0–100)
LYMPHOCYTES # BLD AUTO: 2.83 THOUSANDS/ΜL (ref 0.6–4.47)
LYMPHOCYTES NFR BLD AUTO: 45 % (ref 14–44)
MCH RBC QN AUTO: 28.4 PG (ref 26.8–34.3)
MCHC RBC AUTO-ENTMCNC: 32.2 G/DL (ref 31.4–37.4)
MCV RBC AUTO: 88 FL (ref 82–98)
MONOCYTES # BLD AUTO: 0.63 THOUSAND/ΜL (ref 0.17–1.22)
MONOCYTES NFR BLD AUTO: 10 % (ref 4–12)
NEUTROPHILS # BLD AUTO: 2.7 THOUSANDS/ΜL (ref 1.85–7.62)
NEUTS SEG NFR BLD AUTO: 43 % (ref 43–75)
NONHDLC SERPL-MCNC: 59 MG/DL
NRBC BLD AUTO-RTO: 0 /100 WBCS
PLATELET # BLD AUTO: 334 THOUSANDS/UL (ref 149–390)
PMV BLD AUTO: 9 FL (ref 8.9–12.7)
POTASSIUM SERPL-SCNC: 3.6 MMOL/L (ref 3.5–5.3)
PROT SERPL-MCNC: 6 G/DL (ref 6.4–8.2)
RBC # BLD AUTO: 4.43 MILLION/UL (ref 3.81–5.12)
RPR SER QL: NORMAL
SODIUM SERPL-SCNC: 140 MMOL/L (ref 136–145)
TRIGL SERPL-MCNC: 51 MG/DL
TSH SERPL DL<=0.05 MIU/L-ACNC: 0.78 UIU/ML (ref 0.36–3.74)
WBC # BLD AUTO: 6.33 THOUSAND/UL (ref 4.31–10.16)

## 2018-07-27 PROCEDURE — 85025 COMPLETE CBC W/AUTO DIFF WBC: CPT | Performed by: PSYCHIATRY & NEUROLOGY

## 2018-07-27 PROCEDURE — 80061 LIPID PANEL: CPT | Performed by: PSYCHIATRY & NEUROLOGY

## 2018-07-27 PROCEDURE — 99232 SBSQ HOSP IP/OBS MODERATE 35: CPT | Performed by: PSYCHIATRY & NEUROLOGY

## 2018-07-27 PROCEDURE — 86592 SYPHILIS TEST NON-TREP QUAL: CPT | Performed by: PSYCHIATRY & NEUROLOGY

## 2018-07-27 PROCEDURE — 80053 COMPREHEN METABOLIC PANEL: CPT | Performed by: PSYCHIATRY & NEUROLOGY

## 2018-07-27 PROCEDURE — 84443 ASSAY THYROID STIM HORMONE: CPT | Performed by: PSYCHIATRY & NEUROLOGY

## 2018-07-27 RX ORDER — MIRTAZAPINE 15 MG/1
30 TABLET, FILM COATED ORAL
Status: DISCONTINUED | OUTPATIENT
Start: 2018-07-27 | End: 2018-07-31 | Stop reason: HOSPADM

## 2018-07-27 RX ADMIN — MIRTAZAPINE 30 MG: 15 TABLET, FILM COATED ORAL at 21:54

## 2018-07-27 RX ADMIN — LORAZEPAM 0.5 MG: 0.5 TABLET ORAL at 09:07

## 2018-07-27 RX ADMIN — HYDROXYZINE HYDROCHLORIDE 25 MG: 25 TABLET, FILM COATED ORAL at 23:40

## 2018-07-27 RX ADMIN — LORAZEPAM 0.5 MG: 0.5 TABLET ORAL at 19:17

## 2018-07-27 RX ADMIN — HYDROXYZINE HYDROCHLORIDE 25 MG: 25 TABLET, FILM COATED ORAL at 03:18

## 2018-07-27 RX ADMIN — TRAZODONE HYDROCHLORIDE 50 MG: 50 TABLET ORAL at 21:31

## 2018-07-27 RX ADMIN — NICOTINE POLACRILEX 2 MG: 2 GUM, CHEWING BUCCAL at 13:43

## 2018-07-27 NOTE — PROGRESS NOTES
Pt polite and cooperative, responds appropriately during interactions  Mostly seclusive to room  Pt reported she observed skin rash on right upper torso and back, appeared to conform to clothing/bra line  Pt denied pain, discomfort, or urticaria  Stated she just observed rash today but is unsure of when it originated  Nursing informed

## 2018-07-27 NOTE — PLAN OF CARE
Problem: DISCHARGE PLANNING  Goal: Discharge to home or other facility with appropriate resources  INTERVENTIONS:  - Identify barriers to discharge w/patient and caregiver  - Arrange for needed discharge resources and transportation as appropriate  - Identify discharge learning needs (meds, wound care, etc )  - Arrange for interpretive services to assist at discharge as needed  - Refer to Case Management Department for coordinating discharge planning if the patient needs post-hospital services based on physician/advanced practitioner order or complex needs related to functional status, cognitive ability, or social support system   Outcome: Progressing      Problem: Ineffective Coping  Goal: Participates in unit activities  Interventions:  - Provide therapeutic environment   - Provide required programming   - Redirect inappropriate behaviors    Outcome: Progressing      Problem: Risk for Violence/Aggression Toward Others  Goal: Control angry outbursts  Interventions:  - Monitor patient closely, per order  - Ensure early verbal de-escalation  - Monitor prn medication needs  - Set reasonable/therapeutic limits, outline behavioral expectations, and consequences   - Provide a non-threatening milieu, utilizing the least restrictive interventions    Outcome: Progressing    Goal: Identify appropriate positive anger management techniques  Interventions:  - Offer anger management and coping skills groups   - Staff will provide positive feedback for appropriate anger control   Outcome: Progressing

## 2018-07-27 NOTE — PROGRESS NOTES
@6000 patient was given Risperdal for sleep  Pt was reassessd @ [phone] she remains asleep at this present time  Will continue to monitor

## 2018-07-27 NOTE — PROGRESS NOTES
Progress Note - 400 [facility] 26 y o  female MRN: [id number]  Unit/Bed#: [facility] 258-02 Encounter: [id number]    Assessment/Plan   Principal Problem:    Severe episode of recurrent major depressive disorder, without psychotic features ([facility] 75 )  Active Problems:    Cannabis abuse    Generalized anxiety disorder    Alcohol use disorder ([facility] 75 )      Subjective:  Patient is compliant with medications with no acute side effects  Patient remained with depression and constricted affect with crying during evaluation  Patient reports persistence of insomnia but she did not requested p r n  medication last night  Patient educated on utilizing that tonight if insomnia persists  Patient denies endorsing manic or psychotic symptoms  Agreed with plan of increasing mirtazapine dose tonight and holding the dose over the weekend  She is consenting for safety on the unit  Current Medications:    Current Facility-Administered Medications:  acetaminophen 650 mg Oral Q6H PRN Jean Claude King MD   aluminum-magnesium hydroxide-simethicone 30 mL Oral Q4H PRN Jean Claude King MD   benztropine 1 mg Intramuscular Q6H PRN Jean Claude King MD   benztropine 1 mg Oral Q6H PRN Jean Claude King MD   hydrOXYzine HCL 25 mg Oral Q6H PRN Jean Claude King MD   ibuprofen 600 mg Oral Q8H PRN Jean Claude King MD   ibuprofen 800 mg Oral Q8H PRN Jean Claude King MD   LORazepam 0 5 mg Oral BID Venkata Humphries   magnesium hydroxide 30 mL Oral Daily PRN Jean Claude King MD   mirtazapine 15 mg Oral HS Venkata Humphries   nicotine polacrilex 2 mg Oral Q2H PRN Venkata Humphries   risperiDONE 1 mg Oral Q6H PRN Jean Claude King MD   traZODone 50 mg Oral HS PRN Jean Claude King MD       Behavioral Health Medications: all current active meds have been reviewed      Vital signs in last 24 hours:  Temp:  [96 5 °F (35 8 °C)-97 5 °F (36 4 °C)] 96 5 °F (35 8 °C)  HR:  [64-79] 74  Resp:  [18] 18  BP: (107-131)/(56-91) 107/56    Laboratory results:    I have personally reviewed all pertinent laboratory/tests results    Labs in last 72 hours:   Recent Labs      07/27/18   0603  07/27/18   0604   WBC   --   6 33   RBC   --   4 43   HGB   --   12 6   HCT   --   39 1   PLT   --   334   RDW   --   13 7   NEUTROABS   --   2 70   NA  140   --    K  3 6   --    CL  105   --    CO2  30   --    BUN  9   --    CREATININE  0 88   --    GLUCOSE  81   --    CALCIUM  8 6   --    AST  18   --    ALT  23   --    ALKPHOS  81   --    PROT  6 0*   --    BILITOT  0 30   --    CHOL  132   --    HDL  73*   --    TRIG  51   --    LDLCALC  49   --    [id number]  0 780   --      Admission Labs:   Admission on 07/25/2018   Component Date Value    Amph/Meth UR 07/26/2018 Negative     Barbiturate Ur 07/26/2018 Negative     Benzodiazepine Urine 07/26/2018 Negative     Cocaine Urine 07/26/2018 Positive*    Methadone Urine 07/26/2018 Negative     Opiate Urine 07/26/2018 Negative     PCP Ur 07/26/2018 Negative     THC Urine 07/26/2018 Positive*    WBC 07/27/2018 6 33     RBC 07/27/2018 4 43     Hemoglobin 07/27/2018 12 6     Hematocrit 07/27/2018 39 1     MCV 07/27/2018 88     MCH 07/27/2018 28 4     MCHC 07/27/2018 32 2     RDW 07/27/2018 13 7     MPV 07/27/2018 9 0     Platelets 49/82/5078 334     nRBC 07/27/2018 0     Neutrophils Relative 07/27/2018 43     Immat GRANS % 07/27/2018 0     Lymphocytes Relative 07/27/2018 45*    Monocytes Relative 07/27/2018 10     Eosinophils Relative 07/27/2018 1     Basophils Relative 07/27/2018 1     Neutrophils Absolute 07/27/2018 2 70     Immature Grans Absolute 07/27/2018 0 02     Lymphocytes Absolute 07/27/2018 2 83     Monocytes Absolute 07/27/2018 0 63     Eosinophils Absolute 07/27/2018 0 08     Basophils Absolute 07/27/2018 0 07     Sodium 07/27/2018 140     Potassium 07/27/2018 3 6     Chloride 07/27/2018 105     CO2 07/27/2018 30     Anion Gap 07/27/2018 5     BUN 07/27/2018 9     Creatinine 07/27/2018 0 88     Glucose 07/27/2018 81     Glucose, Fasting 07/27/2018 81     Calcium 07/27/2018 8 6     AST 07/27/2018 18     ALT 07/27/2018 23     Alkaline Phosphatase 07/27/2018 81     Total Protein 07/27/2018 6 0*    Albumin 07/27/2018 3 1*    Total Bilirubin 07/27/2018 0 30     eGFR 07/27/2018 98     Cholesterol 07/27/2018 132     Triglycerides 07/27/2018 51     HDL, Direct 07/27/2018 73*    LDL Calculated 07/27/2018 49     Non-HDL-Chol (CHOL-HDL) 07/27/2018 59     TSH 3RD GENERATON 07/27/2018 0 780        Psychiatric Review of Systems:  Behavior over the last 24 hours:  unchanged  Sleep: insomnia  Appetite: normal  Medication side effects: No  ROS: no complaints    Mental Status Evaluation:  Appearance:  casually dressed   Behavior:  guarded   Speech:  soft   Mood:  anxious and depressed   Affect:  constricted   Language naming objects   Thought Process:  circumstantial   Thought Content:  obsessions   Perceptual Disturbances: None   Risk Potential: Suicidal Ideations without plan, Homicidal Ideations none and Potential for Aggression No   Sensorium:  person and place   Cognition:  grossly intact   Consciousness:  awake    Attention: attention span appeared shorter than expected for age   Insight:  limited   Judgment: limited   Intellect fair   Gait/Station: normal gait/station   Motor Activity: no abnormal movements     Memory: Short and long term memory  fair     Progress Toward Goals: progressing    Recommended Treatment:   Increase mirtazapine to 30 mg at HS for depression and insomnia management  Patient educated to utilize trazodone 50 mg at HS p r n  if insomnia persists  Educated to not use trazodone after midnight for risk of early morning sedation  Continue with group therapy, milieu therapy and occupational therapy      Continue following current medications:   Current Facility-Administered Medications:  acetaminophen 650 mg Oral Q6H PRN Aristeo Gonzalez MD   aluminum-magnesium hydroxide-simethicone 30 mL Oral Q4H PRN Aristeo Gonzalez MD benztropine 1 mg Intramuscular Q6H PRN Sushma Mendez MD   benztropine 1 mg Oral Q6H PRN Sushma Mendez MD   hydrOXYzine HCL 25 mg Oral Q6H PRN Sushma Mendez MD   ibuprofen 600 mg Oral Q8H PRN Sushma Mendez MD   ibuprofen 800 mg Oral Q8H PRN Sushma Mendez MD   LORazepam 0 5 mg Oral BID [facility]   magnesium hydroxide 30 mL Oral Daily PRN Sushma Mendez MD   mirtazapine 15 mg Oral HS [facility]   nicotine polacrilex 2 mg Oral Q2H PRN [facility]   risperiDONE 1 mg Oral Q6H PRN Sushma Mendez MD   traZODone 50 mg Oral HS PRN Sushma Mendez MD       Risks, benefits and possible side effects of Medications:   Risks, benefits, and possible side effects of medications explained to patient and patient verbalizes understanding  Risks of medications in pregnancy explained if female patient  Patient verbalizes understanding and agrees to notify her doctor if she becomes pregnant  This note has been constructed using a voice recognition system  There may be translation, syntax,  or grammatical errors  If you have any questions, please contact the dictating provider

## 2018-07-28 PROCEDURE — 99231 SBSQ HOSP IP/OBS SF/LOW 25: CPT | Performed by: STUDENT IN AN ORGANIZED HEALTH CARE EDUCATION/TRAINING PROGRAM

## 2018-07-28 RX ADMIN — HYDROXYZINE HYDROCHLORIDE 25 MG: 25 TABLET, FILM COATED ORAL at 12:43

## 2018-07-28 RX ADMIN — MIRTAZAPINE 30 MG: 15 TABLET, FILM COATED ORAL at 21:35

## 2018-07-28 RX ADMIN — LORAZEPAM 0.5 MG: 0.5 TABLET ORAL at 17:06

## 2018-07-28 RX ADMIN — LORAZEPAM 0.5 MG: 0.5 TABLET ORAL at 08:26

## 2018-07-28 NOTE — PROGRESS NOTES
Progress Note - 400 [facility] 26 y o  female MRN: [id number]  Unit/Bed#: [facility] 605-16 Encounter: [id number]    Subjective:    Per nursing, patient has been doing well, denies any concerns at this time, denies any suicidal or homicidal ideation, feels medication has been effective  Per patient, patient reports things have been going well  She reports feeling the medication has been helping  She reports not feeling the groups have not been helpful  Patient reports has been "good," reports her anxiety has been down a lot, reports feeling sad at times, missing her daughter a bit, has a 10 y/o daughter  She reports that she has been staying with her daughter  Patient reports her sleep has been okay, some trouble sleeping last night, had something on her mind last night  Patient reports that she has been taking Hydroxyzine prn for her anxiety symptoms  She reports appetite has been much better, reports that she had a low appetite before  Patient denies any passive or active suicidal ideation, intent, or plan  She reports trying to remain hopeful, wants to get things back on track  She reports that she had been self-medicating with alcohol and cannabis  Patient denies any withdrawal symptoms        Behavior over the last 24 hours:  improved  Medication side effects: No  ROS: no complaints    Objective:    Temp:  [96 6 °F (35 9 °C)-98 °F (36 7 °C)] 98 °F (36 7 °C)  HR:  [68-76] 76  Resp:  [18] 18  BP: (116-126)/(74-86) 126/86    Mental Status Evaluation:  Appearance:  sitting comfortably in chair, dressed in casual clothing, cooperative with interview, fairly well related   Behavior:  No tics, tremors, or behaviors observed   Speech:  Normal rate, rhythm, and volume   Mood:  "Good"   Affect:  Appears generally euthymic, stable, mood-congruent   Thought Process:  Linear and goal directed   Associations intact associations   Thought Content:  No passive or active suicidal or homicidal ideation, intent, or plan  Perceptual Disturbances: Denies any auditory or visual hallucinations   Sensorium:  Oriented to person, place, time, and situation   Memory:  recent and remote memory grossly intact   Consciousness:  alert and awake   Attention: attention span and concentration were age appropriate   Insight:  fair   Judgment: fair   Gait/Station: normal gait/station   Motor Activity: no abnormal movements       Labs: I have personally reviewed all pertinent laboratory/tests results  Labs in last 72 hours:   Recent Labs      07/27/18   0603  07/27/18   0604   WBC   --   6 33   RBC   --   4 43   HGB   --   12 6   HCT   --   39 1   PLT   --   334   RDW   --   13 7   NEUTROABS   --   2 70   NA  140   --    K  3 6   --    CL  105   --    CO2  30   --    BUN  9   --    CREATININE  0 88   --    GLUCOSE  81   --    CALCIUM  8 6   --    AST  18   --    ALT  23   --    ALKPHOS  81   --    PROT  6 0*   --    BILITOT  0 30   --    CHOL  132   --    HDL  73*   --    TRIG  51   --    LDLCALC  49   --    [id number]  0 780   --    RPR   --   Non-Reactive       Progress Toward Goals: Progressing    Recommended Treatment: Continue with group therapy, milieu therapy and occupational therapy  Risks, benefits and possible side effects of Medications:   Risks, benefits, and possible side effects of medications explained to patient and patient verbalizes understanding  Medications: all current active meds have been reviewed      Current Facility-Administered Medications:  acetaminophen 650 mg Oral Q6H PRN Celso Andre MD   aluminum-magnesium hydroxide-simethicone 30 mL Oral Q4H PRN Celso Andre MD   benztropine 1 mg Intramuscular Q6H PRCOLLIN Andre MD   benztropine 1 mg Oral Q6H PRN Celso Andre MD   hydrOXYzine HCL 25 mg Oral Q6H PRN Celso Andre MD   ibuprofen 600 mg Oral Q8H PRCOLLIN Andre MD   ibuprofen 800 mg Oral Q8H PRN Celso Andre MD   LORazepam 0 5 mg Oral BID Venkata Humphries   magnesium hydroxide 30 mL Oral Daily PRN Nathan Rivero MD   mirtazapine 30 mg Oral HS Venkata Humphries   nicotine polacrilex 2 mg Oral Q2H PRN Venkata Humphries   risperiDONE 1 mg Oral Q6H PRN Nathan Rivero MD   traZODone 50 mg Oral HS PRN Nathan Rivero MD           Assessment/Plan   Principal Problem:    Severe episode of recurrent major depressive disorder, without psychotic features ([facility] 75 )  Active Problems:    Cannabis abuse    Generalized anxiety disorder    Alcohol use disorder ([place] [address] )    25 y/o Female with MDD, cannabis abuse, HÉCTOR, alcohol use disorder- mood continues to improve, improving sleep, denying any current SI  Plan:  -Continue current med regimen   -Started Ensure supplements for additional nutrition

## 2018-07-28 NOTE — PROGRESS NOTES
Pt has been pleasant this evening  Seclusive to room and self for most of the shift  Compliant with medications  Denies SI  No questions or concerns at this time  Will continue to monitor

## 2018-07-28 NOTE — PROGRESS NOTES
Pt states she is doing well, no concerns at this time  Denies SI/HI  States she is mainly here for medication adjustments, states medications and prn are are effective for sleep  Positive participation in group  Pleasant and cooperative

## 2018-07-28 NOTE — PROGRESS NOTES
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

## 2018-07-28 NOTE — PROGRESS NOTES
Requested & received Trazodone 50 mg po prn/sleep at 2131  Good effect obtained, as observed asleep in bed within the hr

## 2018-07-29 PROCEDURE — 99231 SBSQ HOSP IP/OBS SF/LOW 25: CPT | Performed by: STUDENT IN AN ORGANIZED HEALTH CARE EDUCATION/TRAINING PROGRAM

## 2018-07-29 RX ADMIN — MIRTAZAPINE 30 MG: 15 TABLET, FILM COATED ORAL at 21:39

## 2018-07-29 RX ADMIN — IBUPROFEN 600 MG: 600 TABLET ORAL at 15:49

## 2018-07-29 RX ADMIN — LORAZEPAM 0.5 MG: 0.5 TABLET ORAL at 08:38

## 2018-07-29 RX ADMIN — LORAZEPAM 0.5 MG: 0.5 TABLET ORAL at 17:20

## 2018-07-29 NOTE — PLAN OF CARE
Problem: SELF HARM/SUICIDALITY  Goal: Will have no self-injury during hospital stay  INTERVENTIONS:  - Q 15 MINUTES: Routine safety checks  - Q WAKING SHIFT & PRN: Assess risk to determine if routine checks are adequate to maintain patient safety  - Encourage patient to participate actively in care by formulating a plan to combat response to suicidal ideation, identify supports and resources   Outcome: Progressing      Problem: DEPRESSION  Goal: Will be euthymic at discharge  INTERVENTIONS:  - Administer medication as ordered  - Provide emotional support via 1:1 interaction with staff  - Encourage involvement in milieu/groups/activities  - Monitor for social isolation   Outcome: Progressing      Problem: ANXIETY  Goal: Will report anxiety at manageable levels  INTERVENTIONS:  - Administer medication as ordered  - Teach and encourage coping skills  - Provide emotional support  - Assess patient/family for anxiety and ability to cope   Outcome: Progressing

## 2018-07-29 NOTE — PROGRESS NOTES
Progress Note - 400 [facility] 26 y o  female MRN: [id number]  Unit/Bed#: [facility] 261-01 Encounter: [id number]    Subjective:    Per nursing, patient is pleasant and cooperative, socializing with peers  Per patient, patient reports that she's been out of her room, interacting with other patients  Patient reports her mood has been "good," reports sleeping a bit better last night  Patient reports appetite has been good  Patient reports her energy has been good  Denies any passive or active suicidal ideation, intent, or plan  She reports worries about getting her life back in order, back on track  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  Patient reports taking Trazodone last night with good effect  Behavior over the last 24 hours:  improved  Medication side effects: No  ROS: no complaints    Objective:    Temp:  [98 °F (36 7 °C)] 98 °F (36 7 °C)  HR:  [57-76] 57  Resp:  [16-18] 16  BP: (126-129)/(71-86) 129/71    Mental Status Evaluation:  Appearance:  sitting comfortably in chair, adequate hygiene and grooming, cooperative with interview, dressed in hospital gown   Behavior:  No tics, tremors, or behaviors observed   Speech:  Normal rate, rhythm, and volume   Mood:  "Good"   Affect:  Appears mildly constricted in depressed range, stable, mood-congruent   Thought Process:  Linear and goal directed   Associations intact associations   Thought Content:  No passive or active suicidal or homicidal ideation, intent, or plan  Perceptual Disturbances: Denies any auditory or visual hallucinations   Sensorium:  Oriented to person, place, time, and situation   Memory:  recent and remote memory grossly intact   Consciousness:  alert and awake   Attention: attention span and concentration were age appropriate   Insight:  fair   Judgment: fair   Gait/Station: normal gait/station   Motor Activity: no abnormal movements       Labs:    I have personally reviewed all pertinent laboratory/tests results  Labs in last 72 hours:   Recent Labs      07/27/18   0603  07/27/18   0604   WBC   --   6 33   RBC   --   4 43   HGB   --   12 6   HCT   --   39 1   PLT   --   334   RDW   --   13 7   NEUTROABS   --   2 70   NA  140   --    K  3 6   --    CL  105   --    CO2  30   --    BUN  9   --    CREATININE  0 88   --    GLUCOSE  81   --    CALCIUM  8 6   --    AST  18   --    ALT  23   --    ALKPHOS  81   --    PROT  6 0*   --    BILITOT  0 30   --    CHOL  132   --    HDL  73*   --    TRIG  51   --    LDLCALC  49   --    [id number]  0 780   --    RPR   --   Non-Reactive       Progress Toward Goals: Progressing    Recommended Treatment: Continue with group therapy, milieu therapy and occupational therapy  Risks, benefits and possible side effects of Medications:   Risks, benefits, and possible side effects of medications explained to patient and patient verbalizes understanding  Medications: all current active meds have been reviewed      Current Facility-Administered Medications:  acetaminophen 650 mg Oral Q6H PRN Jia Barrera MD   aluminum-magnesium hydroxide-simethicone 30 mL Oral Q4H PRN Jia Barrera MD   benztropine 1 mg Intramuscular Q6H PRN Jia Barrera MD   benztropine 1 mg Oral Q6H PRN Jia Barrera MD   hydrOXYzine HCL 25 mg Oral Q6H PRN Jia Barrera MD   ibuprofen 600 mg Oral Q8H PRN Jia Barrera MD   ibuprofen 800 mg Oral Q8H PRN Jia Barrera MD   LORazepam 0 5 mg Oral BID Venkata Humphries   magnesium hydroxide 30 mL Oral Daily PRN Jia Barrera MD   mirtazapine 30 mg Oral HS [facility]   nicotine polacrilex 2 mg Oral Q2H PRN Venkata Humphries   risperiDONE 1 mg Oral Q6H PRN Jia Barrera MD   traZODone 50 mg Oral HS PRN Jia Barrera MD           Assessment/Plan   Principal Problem:    Severe episode of recurrent major depressive disorder, without psychotic features ([place] [address] )  Active Problems:    Cannabis abuse    Generalized anxiety disorder    Alcohol use disorder ([place] [address] )    27 y/o Female with MDD, HÉCTOR- continues to have improvement in mood symptoms, improving sleep and appetite, no current SI  Plan:  -Continue current treatment plan

## 2018-07-29 NOTE — PROGRESS NOTES
Pleasant and cooperative, agreed to room change, pt more animated, in lounge with peers for long periods of time

## 2018-07-30 PROCEDURE — 99232 SBSQ HOSP IP/OBS MODERATE 35: CPT | Performed by: PSYCHIATRY & NEUROLOGY

## 2018-07-30 RX ADMIN — MIRTAZAPINE 30 MG: 15 TABLET, FILM COATED ORAL at 21:34

## 2018-07-30 RX ADMIN — LORAZEPAM 0.5 MG: 0.5 TABLET ORAL at 17:06

## 2018-07-30 RX ADMIN — LORAZEPAM 0.5 MG: 0.5 TABLET ORAL at 08:19

## 2018-07-30 NOTE — CASE MANAGEMENT
CM outreached to [facility] located @ [address] [place] [address] Q) [phone] (m) [phone] and spoke w/ Emely Solomon  PT is scheduled for an in-take assessment for individual therapy and psychiatric medication management on Wednesday 08/01/18 @ 8:30am with Lax

## 2018-07-30 NOTE — PROGRESS NOTES
Progress Note - Behavioral Health   Rayna Li 26 y o  female MRN: [id number]  Unit/Bed#: [facility] 261-01 Encounter: [id number]    Assessment/Plan   Principal Problem:    Severe episode of recurrent major depressive disorder, without psychotic features ([facility] 75 )  Active Problems:    Cannabis abuse    Generalized anxiety disorder    Alcohol use disorder ([facility] 75 )      Subjective:  Patient reporting decreased depression with more forward thinking, improved sleep, increased appetite, and resolution of passive death wishes  Is being observed for signs of clarence with reported history of bipolar disorder  Appears more constricted and guarded than manic signs  Is overall cooperative in conversation  Focused on discharge at this time and signed 72 hours 2 days ago  Anxiety reported as more manageable with scheduled Ativan  Adamantly denies cocaine usage and believes THC was laced  Does not want to go to rehab facility  Guilt over alcohol abuse and reports feeling more depressed when sobering up  No signs of agitation, lability, and denied psychosis  Is medication compliant  Appears to be tolerating medications well without serious side effects  Appears insight is somewhat improved        Current Medications:  Current Facility-Administered Medications   Medication Dose Route Frequency    acetaminophen (TYLENOL) tablet 650 mg  650 mg Oral Q6H PRN    aluminum-magnesium hydroxide-simethicone (MYLANTA) 200-200-20 mg/5 mL oral suspension 30 mL  30 mL Oral Q4H PRN    benztropine (COGENTIN) injection 1 mg  1 mg Intramuscular Q6H PRN    benztropine (COGENTIN) tablet 1 mg  1 mg Oral Q6H PRN    hydrOXYzine HCL (ATARAX) tablet 25 mg  25 mg Oral Q6H PRN    ibuprofen (MOTRIN) tablet 600 mg  600 mg Oral Q8H PRN    ibuprofen (MOTRIN) tablet 800 mg  800 mg Oral Q8H PRN    LORazepam (ATIVAN) tablet 0 5 mg  0 5 mg Oral BID    magnesium hydroxide (MILK OF MAGNESIA) 400 mg/5 mL oral suspension 30 mL  30 mL Oral Daily PRN    mirtazapine (REMERON) tablet 30 mg  30 mg Oral HS    nicotine polacrilex (NICORETTE) gum 2 mg  2 mg Oral Q2H PRN    risperiDONE (RisperDAL M-TABS) dispersible tablet 1 mg  1 mg Oral Q6H PRN    traZODone (DESYREL) tablet 50 mg  50 mg Oral HS PRN       Behavioral Health Medications: all current active meds have been reviewed and continue current psychiatric medications  Vitals:  Vitals:    07/30/18 0739   BP: 121/74   Pulse: 83   Resp: 18   Temp: (!) 96 2 °F (35 7 °C)   SpO2:        Laboratory results:    I have personally reviewed all pertinent laboratory/tests results  Most Recent Labs:   Lab Results   Component Value Date    WBC 6 33 07/27/2018    RBC 4 43 07/27/2018    HGB 12 6 07/27/2018    HCT 39 1 07/27/2018     07/27/2018    RDW 13 7 07/27/2018    NEUTROABS 2 70 07/27/2018     07/27/2018    K 3 6 07/27/2018     07/27/2018    CO2 30 07/27/2018    BUN 9 07/27/2018    CREATININE 0 88 07/27/2018    GLUCOSE 81 07/27/2018    CALCIUM 8 6 07/27/2018    AST 18 07/27/2018    ALT 23 07/27/2018    ALKPHOS 81 07/27/2018    PROT 6 0 (L) 07/27/2018    BILITOT 0 30 07/27/2018    CHOL 132 07/27/2018    HDL 73 (H) 07/27/2018    TRIG 51 07/27/2018    LDLCALC 49 07/27/2018    [id number] 0 780 07/27/2018    PREGTESTUR negative 04/30/2016     (H) 07/10/2015    RPR Non-Reactive 07/27/2018       Psychiatric Review of Systems:  Behavior over the last 24 hours:  improved  Sleep: improved  Appetite: increased  Medication side effects: No  ROS: no complaints    Mental Status Evaluation:  Appearance:  casually dressed   Behavior:  less guarded, cooperative   Speech:  soft   Mood:  less depressed and anxious   Affect:  mood-congruent   Language appropriate   Thought Process:  normal   Thought Content:  normal  Denied delusions/obsessions   Perceptual Disturbances: None   Risk Potential: Denied SI/HI   Potential for aggression: No   Sensorium:  person, place and time/date   Cognition:  grossly intact   Consciousness: alert and awake    Recent and Remote Memory intact   Attention: attention span and concentration were age appropriate   Insight:  partial   Judgment: fair   Gait/Station: normal gait/station and normal balance   Motor Activity: no abnormal movements     Progress Toward Goals: progressing    Recommended Treatment: Continue with group therapy, milieu therapy and occupational therapy  1   Continue current medications  2  Disposition planning with tentative discharge tomorrow (72 hour notice signed  No 302 behaviors observed)    Risks, benefits and possible side effects of Medications:   Risks, benefits, and possible side effects of medications explained to patient and patient verbalizes understanding        Katia Naqvi PA-C

## 2018-07-30 NOTE — CASE MANAGEMENT
CM completed a [facility] located @ [address] (S) [phone] (W) [phone] referral  CM faxed over PT's face sheet, ICM referral, medication list, and psychiatric evaluation  CM outreached to [facility] and left a voice-mail for [facility] Supervisor Silva Mazariegos requesting a call back to confirm that PT's ICM referral was received

## 2018-07-30 NOTE — CASE MANAGEMENT
CM met w/ PT today  CM provided an update regarding referrals for O/P [address] [place] for therapy and psychiatric medication management along w/ ICM services  PT reports that she signed a 72 hr notice over the weekend that PT believes expires tomorrow 07/31/18 @ 1:00pm  Per nursing PT signed a 72 hr notice on 07/28/18 @ 1300  CM notified MD of this  PT expresses readiness for D/C and verbalized being in support of aftercare services  PT communicates that she has a ride home for D/C tomorrow

## 2018-07-30 NOTE — PROGRESS NOTES
Pt appears bright  States she has been sleeping well, is pleased with gaining a few pounds due to having regular meals here  Denies SI, no concerns at this time  Expresses that outpatient services will be helpful

## 2018-07-30 NOTE — DISCHARGE INSTR - OTHER ORDERS
You were provided with the following information, resources, and services throughout [facility]:     Drug and alcohol service providers, 12 step meetings, and support groups were added to your discharge folder  [address], housing assistance, pathways referral, homelessness prevention, soup kobi, pharmaceutical assistance, linkage street ministry and additional information added in the discharge folder  Salo Houser is a confidential 7 days/week telephone support service manned by trained mental health consumers  [facility] operates daily but is not able to accept calls between 2AM-6AM  [facility] provides support, a listening ear and can provide information about available services  [facility] specializes in the concerns of mental health consumers, their families and friends  However, we are also here for anyone who has a mental health concern, is confused about or just doesn't know anything about mental health or where to get information  To reach Salo Houser, call [phone] accepts calls between 6:00 AM to 10:00 AM and from 4:00 PM to 12:00 AM      Crisis Text Line is free, 24/7 support for those in crisis  Text [phone] from anywhere in the [place] to text with a trained Crisis Counselor  [facility] ([facility]) AT [place] [facility] - licensed telephone and mobile crisis services that provide mental health assessments to all age groups regardless of income or insurance  Crisis Intervention operates 24-hour/7 days a week  [address] [place] [address] assists consumer who are experiencing a mental health emergency and lack the resources to assist themselves  Immediate intervention for suicidal and depressed individuals with home visits/outreach being top priority  Crisis can be contacted at [phone]  Dial 2-1-1 to get connected/get help   Free, confidential information & referral available 24/7: Aging Services, [address], Counseling, Education/Training, Food/Shelter/Clothing, Health Services, Parenting, Substance Abuse, Support Groups, Volunteer Opportunities, & much more  Phone: 2-1-1 or [phone], Web: Rodolfoshannan Mcgillne, Email: [email or web address]  [facility] Services: According to the 2010 Annual Homeless Assessment Report to Congress, given the economic factors which are many times associated with living with a disability, people with disabilities are at a higher risk for becoming homeless  A homeless adult is 2 5 times more likely to be a person living with a disability than someone without a disability  [facility] is working hard to combat this and prevent homelessness from occurring among people with disabilities  If you are homeless or at-risk of homelessness, we can help you find housing, achieve stability, and maintain your independence! You will work one-on-one with a Community  to identify your housing situation, search for available housing, and develop a housing action plan  Additionally, we can provide additional services to ensure you remain safe and independent in your own home  These services include independent living skills education, peer support, information and referral, advocacy (including landlord-tenant mediation), education on tenant's rights and responsibilities and [address]  Your coordinator can also work with you to establish supports in your community to help with your housing stability based upon individual needs  In addition to these services, our staff members work to educate area landlords on the benefits of renting to persons with disabilities  Through educational workshops and seminars, landlords are provided with education on the Americans with Disabilities Act of [facility], [address], accessibility, special accommodations, etc   For information on our specialized housing services, call us at [phone] and ask to speak to a Aktivito      Career Path: [facility] offers innovative vocational services for young adults with disabilities through the Career Path program in [facility]  The Career Path program matches qualified and motivated young adults with disabilities to employment opportunities in the [facility]  The program is designed specifically for young adults who:    Want to learn workplace and life skills   Are eligible or are currently receiving services through [address]    The program provides future employees with the skills needed to succeed on-the-job, including workplace skills development, such as problem solving, teamwork and customer service, assessments, , and intensive case management  Program components include:    Skills Training (8 weeks): Young adults participate in group activities, seminars, trainings, and community work experiences  Participants attend the program at [facility] every Wednesday, Thursday, and Friday from 8:30 am-3:00 pm  Occasional work experience opportunities occur on Mondays and Tuesdays, but these are voluntary and planned in advance     Vocational Assessments (Approx  2 months): Vocational assessments are planned work experiences in which a participant goes to a worksite with a vocational  and tries out a job for a few hours  The goal is usually to complete 2 or 3 vocational assessments over the span of two months, but this can take longer depending on the type of assessments  These vocational assessments are scheduled in advance, and [employer] Path staff can and will provide transportation to and from the worksite     Job Development (No timetable): After all of the vocational assessments are complete, the participant, Career Path staff and other supports meet to discuss the assessments and develop a plan to pursue specific jobs   Once this meeting occurs, staff will work one-on-one with participants to pursue competitive employment opportunities that meet the participant's goals  There is no standard length of time it takes to find a job  It all depends on the participant's goals and the availability of appropriate opportunities in the community      (90 days): Once a job is obtained, [employer] staff will support each participant on the job on an as-needed basis for the first 90 days of employment  For example, a vocational  may support a person for every minute of his or her first five days on the job  However, as each participant learns his or her job and becomes ready to work more independently, the staff will spend less and less time with him or her  Hence, the goal is to become as independent as possible on the job     Follow Along (No timetable): Once the 90 days are complete, it is sometimes possible for staff to continue to visit participants on the job once or twice a month to ensure success  This service is dependent on procuring funding  This service allows staff to maintain contact with a participant and to step in as needed to help with any issues that may arise on the job (e g  promotions, training, etc )    *Please note: Participants may not take part in all components of the program  Eligibility for each component is determined by the participant's goals and/or needs, and availability of funding  In addition, young adults in the Skills Training component of the program also take part in a person-centered planning  This meeting is focused completely on the young adult, and it is an effort to establish the young adult's goals and a clear plan (short and long-term) to achieve those goals  For more information on eligibility, or general questions about the program, please contact [phone] or email [email or web address]  Outreach to Employers: An additional component of the Dot Path program is educating employers in the [facility] on the benefits of hiring people with disabilities   If you are an employer, consider these facts:    Employees with disabilities have a far lower one-year attrition rate than the general population   Candidates can be trained on site by [employer], at no cost to employers   Career Path staff will match you with job candidates who meet your specific needs   Vocational coaches will supplement new employees' work to ensure productivity during initial training   Career Path staff will provide on-site support to you and the participant    We want to work with you! If you are an employer, business, organization, or educational facility in the [facility] and are interested in hiring a dedicated, long-term employee, please contact [phone] or email [email or web address]  The Career Path program was established in 2011 through a nayeli from the [employer] of [address] ([facility])  The program's expansion into [facility] was made possible through a nayeli from [place] city, as well

## 2018-07-30 NOTE — CASE MANAGEMENT
PT signed RICHI's for the following:     [facility] located @ [address] [place] [address] (T) [phone] (O) [phone]    CM outreached to [facility] located @ [address] [place] [address] (B) [phone] (H) [phone] and spoke w/ Regino Hercules  CM completed a referral for O/P [address] [place] [address] for PT  Regino Hercules communicated that the  was busy; however, plans to call CM back to schedule PT for an in-take assessment for O/P MH therapy and psychiatric medication management

## 2018-07-30 NOTE — PROGRESS NOTES
Pleasant and cooperative during interaction  Social with peers, attending groups  Denies SI/HI  Improved depression and anxiety  Hopeful about future and taking the time to plan for her future and set goals for her and her daughter  Plans to return to work upon discharge  Compliant and content with medication  Past history of OD on medications and his happy she was only prescribed one medication to manage symptoms  Provide with journal  No questions or concerns

## 2018-07-31 VITALS
HEIGHT: 63 IN | BODY MASS INDEX: 18.12 KG/M2 | TEMPERATURE: 98 F | WEIGHT: 102.3 LBS | DIASTOLIC BLOOD PRESSURE: 79 MMHG | OXYGEN SATURATION: 96 % | SYSTOLIC BLOOD PRESSURE: 118 MMHG | HEART RATE: 95 BPM | RESPIRATION RATE: 20 BRPM

## 2018-07-31 PROCEDURE — 99239 HOSP IP/OBS DSCHRG MGMT >30: CPT | Performed by: PSYCHIATRY & NEUROLOGY

## 2018-07-31 RX ORDER — LORAZEPAM 0.5 MG/1
0.5 TABLET ORAL 2 TIMES DAILY
Qty: 30 TABLET | Refills: 3 | Status: SHIPPED | OUTPATIENT
Start: 2018-07-31 | End: 2022-03-04

## 2018-07-31 RX ORDER — MIRTAZAPINE 30 MG/1
30 TABLET, FILM COATED ORAL
Qty: 30 TABLET | Refills: 1 | Status: SHIPPED | OUTPATIENT
Start: 2018-07-31 | End: 2022-03-04

## 2018-07-31 RX ADMIN — LORAZEPAM 0.5 MG: 0.5 TABLET ORAL at 08:28

## 2018-07-31 NOTE — PROGRESS NOTES
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

## 2018-07-31 NOTE — DISCHARGE SUMMARY
Discharge Summary - [address] [facility] 26 y o  female MRN: [id number]  Unit/Bed#: -01 Encounter: [id number]     Admission Date: 7/26/18        Discharge Date: 7/31/18    Attending Psychiatrist: Shauna He MD    Reason for Admission/HPI:   History of Present Illness   Patient is a 26year old female who presented to [facility] due to suicidal ideation with plan to overdose on medications  Precipitating events are being off of psychiatric medications for 5 years (stopped psychiatric medications after becoming pregnant), recent break up with boyfriend, and increased abuse of alcohol  Patient did report questionable history of bipolar disorder with difficulty controlling anger at times  Patient reported over the last month an increase in depressive symptoms of suicidal thoughts, poor sleep, lack of appetite, unintentional weight loss, lack of energy, anhedonia, guilt, worthlessness, irritability, and hopelessness  She reported increased anxiety with panic attacks  She denied psychosis and did not endorse criteria for clarence  Patient has 2 prior inpatient psychiatric hospitalizations, 2 prior suicide attempts, and was not currently seeing outpatient psychiatrist      Psychosocial Stressors: relationship, alcohol abuse      Hospital Course:   Behavioral Health Medications:   current meds:   Current Facility-Administered Medications   Medication Dose Route Frequency    acetaminophen (TYLENOL) tablet 650 mg  650 mg Oral Q6H PRN    aluminum-magnesium hydroxide-simethicone (MYLANTA) 200-200-20 mg/5 mL oral suspension 30 mL  30 mL Oral Q4H PRN    benztropine (COGENTIN) injection 1 mg  1 mg Intramuscular Q6H PRN    benztropine (COGENTIN) tablet 1 mg  1 mg Oral Q6H PRN    hydrOXYzine HCL (ATARAX) tablet 25 mg  25 mg Oral Q6H PRN    ibuprofen (MOTRIN) tablet 600 mg  600 mg Oral Q8H PRN    ibuprofen (MOTRIN) tablet 800 mg  800 mg Oral Q8H PRN    LORazepam (ATIVAN) tablet 0 5 mg 0 5 mg Oral BID    magnesium hydroxide (MILK OF MAGNESIA) 400 mg/5 mL oral suspension 30 mL  30 mL Oral Daily PRN    mirtazapine (REMERON) tablet 30 mg  30 mg Oral HS    nicotine polacrilex (NICORETTE) gum 2 mg  2 mg Oral Q2H PRN    risperiDONE (RisperDAL M-TABS) dispersible tablet 1 mg  1 mg Oral Q6H PRN    traZODone (DESYREL) tablet 50 mg  50 mg Oral HS PRN     Patient was admitted to [address] [place] [address] Inpatient Psychiatric Unit on voluntary 201 commitment for safety and stabilization  On admission patient was placed on Remeron 15mg HS for depression/insomnia/appetite management and Ativan   5mg BID for anxiety management  Remeron was titrated to 30mg HS  She did not require further titration of medications  She signed 72 hour notice early in hospitalization and did not endorse 36 behavior  She tolerated medications with no acute side effects  Her mood brightened over the course of her treatment, and she was seen in [facility] interacting appropriately with peers  She did not demonstrate dangerous behavior to self or others during her inpatient stay  On day of discharge patient had reduced depression, controllable anxiety, denied psychosis, did not show signs of clarence, and denied suicidal/homicidal ideations  Mental Status at time of Discharge:     Appearance:  casually dressed   Behavior:  cooperative   Speech:  normal pitch and normal volume   Mood:  euthymic   Affect:  mood-congruent   Thought Process:  normal   Thought Content:  normal  Denied delusions/obsessions   Perceptual Disturbances: None   Risk Potential: Denied SI/HI   Potential for aggression: No   Sensorium:  person, place and time/date   Cognition:  grossly intact   Consciousness:  alert and awake    Attention: attention span and concentration were age appropriate   Insight:  fair   Judgment: fair   Gait/Station: normal gait/station and normal balance   Motor Activity: no abnormal movements       Discharge Diagnosis:   Severe episode of recurrent major depressive disorder, without psychotic features  Generalized anxiety disorder  Alcohol use disorder  Cannabis abuse    Discharge Medications:  See after visit summary for reconciled discharge medications provided to patient and family  Discharge instructions/Information to patient and family:   See after visit summary for information provided to patient and family  Provisions for Follow-Up Care:  See after visit summary for information related to follow-up care and any pertinent home health orders  Discharge Statement   I spent 33 minutes discharging the patient  This time was spent on the day of discharge  I had direct contact with the patient on the day of discharge  On day of discharge patient had mental status exam performed, discharge instructions/medications reviewed, and outpatient planning discussed  She was given 1 month plus 1 refill of scripts  She denied tobacco cessation therapy and rehab therapy      Chuy Man PA-C

## 2018-07-31 NOTE — DISCHARGE INSTRUCTIONS
Mirtazapine (By mouth)   Mirtazapine ([id number])  Treats depression  Brand Name(s): Remeron, Remeron Soltab   There may be other brand names for this medicine  When This Medicine Should Not Be Used: This medicine is not right for everyone  Do not use it if you had an allergic reaction to mirtazapine  How to Use This Medicine:   Tablet, Dissolving Tablet  · Take your medicine as directed  Your dose may need to be changed several times to find what works best for you  Your doctor may tell you to take this medicine at bedtime, because it can make you sleepy  · You may need to take this medicine for several weeks before you begin to feel better  · Make sure your hands are dry before you handle the disintegrating tablet  Peel back the foil from the blister pack, then remove the tablet  Do not push the tablet through the foil  Place the tablet in your mouth  After it has melted, swallow or take a drink of water  Do not crush, split, or break the tablet  · This medicine should come with a Medication Guide  Ask your pharmacist for a copy if you do not have one  · Missed dose: Take a dose as soon as you remember  If it is almost time for your next dose, wait until then and take a regular dose  Do not take extra medicine to make up for a missed dose  · Store the medicine in a closed container at room temperature, away from heat, moisture, and direct light  Keep the orally disintegrating tablet in the original package until you are ready to take it  Drugs and Foods to Avoid:   Ask your doctor or pharmacist before using any other medicine, including over-the-counter medicines, vitamins, and herbal products  · Do not use this medicine and an MAO inhibitor within 14 days of each other  · Some medicines can affect how mirtazapine works   Tell your doctor if you are using any of the following:  ¨ Buspirone, carbamazepine, cimetidine, diazepam, fentanyl, lithium, nefazodone, phenytoin, rifampicin, Mel's wort, tramadol, or tryptophan  ¨ Other medicine to treat depression, a triptan medicine to treat migraine headaches, medicine to treat an infection, medicine to treat HIV, or a blood thinner (such as warfarin)  · Do not drink alcohol while you are using this medicine  Warnings While Using This Medicine:   · Tell your doctor if you are pregnant or breastfeeding, or if you have kidney disease, liver disease, glaucoma, high cholesterol, heart or blood vessel disease, or a history of seizures, heart attack, or stroke  Tell your doctor if you have phenylketonuria  · For some children, teenagers, and young adults, this medicine may increase mental or emotional problems  This may lead to thoughts of suicide and violence  Talk with your doctor right away if you have any thoughts or behavior changes that concern you  Tell your doctor if you or anyone in your family has a history of bipolar disorder or suicide attempts  · This medicine may cause the following problems:  ¨ Serotonin syndrome (may be life-threatening)  ¨ Decreased white blood cells, which can affect your body's ability to fight an infection  ¨ Low sodium levels in the blood  · Do not stop using this medicine suddenly  Your doctor will need to slowly decrease your dose before you stop it completely  · This medicine may make you dizzy or drowsy  Do not drive or do anything else that could be dangerous until you know how this medicine affects you  Stand or sit up slowly if you feel lightheaded or dizzy  · Your doctor will do lab tests at regular visits to check on the effects of this medicine  Keep all appointments  · Keep all medicine out of the reach of children  Never share your medicine with anyone    Possible Side Effects While Using This Medicine:   Call your doctor right away if you notice any of these side effects:  · Allergic reaction: Itching or hives, swelling in your face or hands, swelling or tingling in your mouth or throat, chest tightness, trouble breathing  · Anxiety, restlessness, fast heartbeat, fever, sweating, muscle spasms, nausea, vomiting, diarrhea, seeing or hearing things that are not there  · Blistering, peeling, red skin rash  · Eye pain, vision changes, seeing halos around lights  · Confusion, weakness, muscle twitching  · Feeling more excited or energetic than usual  · Fever, chills, cough, sore throat, body aches  · Thoughts of hurting yourself or others, worsening depression, unusual behaviors  If you notice these less serious side effects, talk with your doctor:   · Dry mouth, constipation  · Increased appetite or weight gain  · Sleepiness, tiredness  If you notice other side effects that you think are caused by this medicine, tell your doctor  Call your doctor for medical advice about side effects  You may report side effects to FDA at 0-034-FDA-7477  © 2017 [address] Information is for End User's use only and may not be sold, redistributed or otherwise used for commercial purposes  The above information is an  only  It is not intended as medical advice for individual conditions or treatments  Talk to your doctor, nurse or pharmacist before following any medical regimen to see if it is safe and effective for you  Depression   WHAT YOU NEED TO KNOW:   Depression is a medical condition that causes feelings of sadness or hopelessness that do not go away  Depression may cause you to lose interest in things you used to enjoy  These feelings may interfere with your daily life  DISCHARGE INSTRUCTIONS:   Call 911 for any of the following:   · You think about harming yourself or someone else  Contact your healthcare provider if:   · Your symptoms do not improve  · You cannot make it to your next appointment  · You have new symptoms  · You have questions or concerns about your condition or care  Medicines:   · Antidepressants  may be given to improve or balance your mood   You may need to take this medicine for several weeks before you begin to feel better  · Take your medicine as directed  Contact your healthcare provider if you think your medicine is not helping or if you have side effects  Tell him of her if you are allergic to any medicine  Keep a list of the medicines, vitamins, and herbs you take  Include the amounts, and when and why you take them  Bring the list or the pill bottles to follow-up visits  Carry your medicine list with you in case of an emergency  Therapy  may be used to treat your depression  A therapist will help you learn to cope with your thoughts and feelings  This can be done alone or in a group  It may also be done with family members or a significant other  Self-care:   · Get regular physical activity  Try to exercise for 30 minutes, 3 to 5 days a week  Work with your healthcare provider to develop an exercise plan that you enjoy  Physical activity may improve your symptoms  · Get enough sleep  Create a routine to help you relax before bed  You can listen to music, read, or do yoga  Try to go to bed and wake up at the same time every day  Sleep is important for emotional health  · Eat a variety of healthy foods from all of the food groups  A healthy meal plan is low in fat, salt, and added sugar  Ask your healthcare provider for more information about a meal plan that is right for you  · Do not drink alcohol or use drugs  Alcohol and drugs can make your symptoms worse  Follow up with your healthcare provider as directed: Your healthcare provider will monitor your progress at follow-up visits  He or she will also monitor your medicine if you take antidepressants  Your healthcare provider will ask if the medicine is helping  Tell him or her about any side effects or problems you may have with your medicine  The type or amount of medicine may need to be changed  Write down your questions so you remember to ask them during your visits    © 2017 [employer] [address] [facility] is for End User's use only and may not be sold, redistributed or otherwise used for commercial purposes  All illustrations and images included in CareNotes® are the copyrighted property of [employer] , [employer]  or Nithin Alarcon  The above information is an  only  It is not intended as medical advice for individual conditions or treatments  Talk to your doctor, nurse or pharmacist before following any medical regimen to see if it is safe and effective for you  Generalized Anxiety Disorder   WHAT YOU NEED TO KNOW:   Generalized anxiety disorder (HÉCTOR) is a condition that causes you to feel worried or nervous for at least 6 months  The anxiety may be much more severe than the event causing it  You may not be able to do your daily activities because of the anxiety  DISCHARGE INSTRUCTIONS:   Call 911 for any of the following:   · You feel like hurting yourself or someone else  · You have chest pain, tightness, or heaviness that may spread to your shoulders, arms, jaw, neck, or back  Seek care immediately if:   · You feel like fainting or are lightheaded or too dizzy to stand up  Contact your healthcare provider if:   · You have new symptoms since your last visit  · Your anxiety keeps you from doing your daily activities, such as self-care, family, or work  · You have problems that you think may be caused by the medicine you are taking  · Your symptoms are getting worse  · You have questions or concerns about your condition or care  Medicines:   · Medicines  may be given to relieve anxiety and depression and help you feel calm and relaxed  · Take your medicine as directed  Contact your healthcare provider if you think your medicine is not helping or if you have side effects  Tell him or her if you are allergic to any medicine  Keep a list of the medicines, vitamins, and herbs you take  Include the amounts, and when and why you take them   Bring the list or the pill bottles to follow-up visits  Carry your medicine list with you in case of an emergency  Follow up with your healthcare provider as directed:  Write down your questions so you remember to ask them during your visits  Monitor your condition:  Keep a record of the situations that cause your symptoms  Bring the record with you when you see your healthcare provider  Include the following:  · What were you doing when the symptoms started? · Had you eaten anything unusual, or taken a new medicine or herbal supplement? · Were you stressed or upset during the time leading up to the attack? · How often do you have symptoms? How long do they last?    · What were your thoughts and feelings during these situations? · What symptoms did you have? · Did anything help ease or stop the symptoms, such as a relaxation technique? Self-care:   · Limit or do not drink caffeine  Caffeine can increase your heartbeat and make your anxiety symptoms worse  · Limit or do not drink alcohol  Ask your healthcare provider if alcohol is safe for you  You may not be able to drink alcohol if you take certain anxiety or depression medicines  Alcohol can also increase depression  Limit alcohol to 1 drink per day if you are a woman  Limit alcohol to 2 drinks per day if you are a man  A drink of alcohol is 12 ounces of beer, 5 ounces of wine, or 1½ ounces of liquor  · Manage stress  Stress may increase symptoms of HÉCTOR  Relaxation therapy teaches you how to feel less physical and emotional stress  Deep breathing, muscle relaxation, and music are some forms of relaxation therapy  · Avoid hyperventilating  Some people may hyperventilate during an anxiety attack and not even notice it  Hyperventilation means that your breaths are too fast and shallow  Breathing this way can cause numbness or tingling in your hands and lips  During an anxiety attack, focus on taking very slow, deep breaths   Your healthcare provider may show you how to breathe in and out of a paper bag when you hyperventilate  Never use a plastic bag  © 2017 [address] is for End User's use only and may not be sold, redistributed or otherwise used for commercial purposes  All illustrations and images included in CareNotes® are the copyrighted property of A D A M [employer]  or Nithin Alarcon  The above information is an  only  It is not intended as medical advice for individual conditions or treatments  Talk to your doctor, nurse or pharmacist before following any medical regimen to see if it is safe and effective for you

## 2018-07-31 NOTE — DISCHARGE INSTR - LAB
Contact Information: If you have any questions, concerns, pended studies, tests and/or procedures, or emergencies regarding your inpatient behavioral health visit  Please contact [place] [facility] [phone] and ask to speak to a , nurse or physician  A contact is available 24 hours/ 7 days a week at this number  Summary of Procedures Performed During your Stay:  Below is a list of major procedures performed during your hospital stay and a summary of results:  - No major procedures performed  Pending Studies     None        If studies are pending at discharge, follow up with your PCP and/or referring provider

## 2018-07-31 NOTE — CASE MANAGEMENT
CM met w/ PT today  CM reviewed PT's D/C plan  PT confirms that she has her own [facility] transport at this time  CM reviewed PT's aftercare services in detail  PT continues to decline referrals for I/P rehab, PHP, IOP, etc  PT declines to sign RICHI's for CM to be in contact w/ any natural supports throughout PT's hospitalization  PT expresses readiness for D/C and verbalized being in support of D/C plan

## 2018-07-31 NOTE — PROGRESS NOTES
Pt appears calm, socializing with peers  Denies SI, smiling states she is looking forward to discharge  Positive peer influence

## 2019-01-01 ENCOUNTER — HOSPITAL ENCOUNTER (EMERGENCY)
Facility: HOSPITAL | Age: 34
Discharge: HOME | End: 2019-01-01
Attending: EMERGENCY MEDICINE
Payer: COMMERCIAL

## 2019-01-01 ENCOUNTER — APPOINTMENT (EMERGENCY)
Dept: RADIOLOGY | Facility: HOSPITAL | Age: 34
End: 2019-01-01
Payer: COMMERCIAL

## 2019-01-01 VITALS
OXYGEN SATURATION: 97 % | HEART RATE: 113 BPM | RESPIRATION RATE: 20 BRPM | SYSTOLIC BLOOD PRESSURE: 142 MMHG | BODY MASS INDEX: 20.73 KG/M2 | WEIGHT: 117 LBS | DIASTOLIC BLOOD PRESSURE: 83 MMHG | TEMPERATURE: 99.7 F

## 2019-01-01 DIAGNOSIS — R68.89 FLU-LIKE SYMPTOMS: Primary | ICD-10-CM

## 2019-01-01 LAB
BACTERIA UR QL AUTO: ABNORMAL /HPF
BILIRUB UR QL STRIP: NEGATIVE
CLARITY UR: CLEAR
COLOR UR: YELLOW
EXT PREG TEST URINE: NEGATIVE
GLUCOSE UR STRIP-MCNC: NEGATIVE MG/DL
HGB UR QL STRIP.AUTO: ABNORMAL
KETONES UR STRIP-MCNC: NEGATIVE MG/DL
LEUKOCYTE ESTERASE UR QL STRIP: NEGATIVE
NITRITE UR QL STRIP: NEGATIVE
NON-SQ EPI CELLS URNS QL MICRO: ABNORMAL /HPF
PH UR STRIP.AUTO: 7 [PH] (ref 4.5–8)
PROT UR STRIP-MCNC: NEGATIVE MG/DL
RBC #/AREA URNS AUTO: ABNORMAL /HPF
SP GR UR STRIP.AUTO: 1.01 (ref 1–1.03)
UROBILINOGEN UR QL STRIP.AUTO: 1 E.U./DL
WBC #/AREA URNS AUTO: ABNORMAL /HPF

## 2019-01-01 PROCEDURE — 81001 URINALYSIS AUTO W/SCOPE: CPT

## 2019-01-01 PROCEDURE — 99283 EMERGENCY DEPT VISIT LOW MDM: CPT

## 2019-01-01 PROCEDURE — 81025 URINE PREGNANCY TEST: CPT | Performed by: PHYSICIAN ASSISTANT

## 2019-01-01 PROCEDURE — 71046 X-RAY EXAM CHEST 2 VIEWS: CPT

## 2019-01-01 RX ORDER — IBUPROFEN 400 MG/1
400 TABLET ORAL ONCE
Status: COMPLETED | OUTPATIENT
Start: 2019-01-01 | End: 2019-01-01

## 2019-01-01 RX ORDER — GUAIFENESIN 200 MG/1
400 TABLET ORAL EVERY 4 HOURS PRN
Qty: 30 TABLET | Refills: 0 | Status: SHIPPED | OUTPATIENT
Start: 2019-01-01 | End: 2022-03-04

## 2019-01-01 RX ORDER — OSELTAMIVIR PHOSPHATE 75 MG/1
75 CAPSULE ORAL EVERY 12 HOURS
Qty: 10 CAPSULE | Refills: 0 | Status: SHIPPED | OUTPATIENT
Start: 2019-01-01 | End: 2019-01-06

## 2019-01-01 RX ORDER — ACETAMINOPHEN 325 MG/1
650 TABLET ORAL ONCE
Status: COMPLETED | OUTPATIENT
Start: 2019-01-01 | End: 2019-01-01

## 2019-01-01 RX ADMIN — ACETAMINOPHEN 650 MG: 325 TABLET, FILM COATED ORAL at 22:04

## 2019-01-01 RX ADMIN — IBUPROFEN 400 MG: 400 TABLET ORAL at 21:14

## 2019-01-02 NOTE — DISCHARGE INSTRUCTIONS
Influenza   WHAT YOU NEED TO KNOW:   Influenza (the flu) is an infection caused by the influenza virus  The flu is easily spread when an infected person coughs, sneezes, or has close contact with others  You may be able to spread the flu to others for 1 week or longer after signs or symptoms appear  DISCHARGE INSTRUCTIONS:   Call 911 for any of the following:   · You have trouble breathing, and your lips look purple or blue  · You have a seizure  Return to the emergency department if:   · You are dizzy, or you are urinating less or not at all  · You have a headache with a stiff neck, and you feel tired or confused  · You have new pain or pressure in your chest     · Your symptoms, such as shortness of breath, vomiting, or diarrhea, get worse  · Your symptoms, such as fever and coughing, seem to get better, but then get worse  Contact your healthcare provider if:   · You have new muscle pain or weakness  · You have questions or concerns about your condition or care  Medicines: You may need any of the following:  · Acetaminophen  decreases pain and fever  It is available without a doctor's order  Ask how much to take and how often to take it  Follow directions  Acetaminophen can cause liver damage if not taken correctly  · NSAIDs , such as ibuprofen, help decrease swelling, pain, and fever  This medicine is available with or without a doctor's order  NSAIDs can cause stomach bleeding or kidney problems in certain people  If you take blood thinner medicine, always ask your healthcare provider if NSAIDs are safe for you  Always read the medicine label and follow directions  · Antivirals  help fight a viral infection  · Take your medicine as directed  Contact your healthcare provider if you think your medicine is not helping or if you have side effects  Tell him or her if you are allergic to any medicine  Keep a list of the medicines, vitamins, and herbs you take   Include the amounts, and when and why you take them  Bring the list or the pill bottles to follow-up visits  Carry your medicine list with you in case of an emergency  Rest  as much as you can to help you recover  Drink liquids as directed  to help prevent dehydration  Ask how much liquid to drink each day and which liquids are best for you  Prevent the spread of influenza:   · Wash your hands often  Use soap and water  Wash your hands after you use the bathroom, change a child's diapers, or sneeze  Wash your hands before you prepare or eat food  Use gel hand cleanser when soap and water are not available  Do not touch your eyes, nose, or mouth unless you have washed your hands first            · Cover your mouth when you sneeze or cough  Cough into a tissue or the bend of your arm  · Clean shared items with a germ-killing   Clean table surfaces, doorknobs, and light switches  Do not share towels, silverware, and dishes with people who are sick  Wash bed sheets, towels, silverware, and dishes with soap and water  · Wear a mask  over your mouth and nose if you are sick or are near anyone who is sick  · Stay away from others  if you are sick  · Influenza vaccine  helps prevent influenza (flu)  Everyone older than 6 months should get a yearly influenza vaccine  Get the vaccine as soon as it is available, usually in September or October each year  Follow up with your healthcare provider as directed:  Write down your questions so you remember to ask them during your visits  © 2017 [address] Information is for End User's use only and may not be sold, redistributed or otherwise used for commercial purposes  All illustrations and images included in CareNotes® are the copyrighted property of [employer] , [employer]  or Nithin Alarcon  The above information is an  only  It is not intended as medical advice for individual conditions or treatments   Talk to your doctor, nurse or pharmacist before following any medical regimen to see if it is safe and effective for you  DISCHARGE INSTRUCTIONS:    FOLLOW UP WITH YOUR PRIMARY CARE PROVIDER OR THE [address] [place] [address]  MAKE AN APPOINTMENT TO BE SEEN  TAKE TYLENOL OR MOTRIN FOR PAIN OR FEVER  TAKE TAMIFLU AS PRESCRIBED FOR FLU SYMPTOMS  IF RASH, SHORTNESS OF BREATH OR TROUBLE SWALLOWING, STOP TAKING THE MEDICATION AND BE SEEN  TAKE GUAIFENESIN AS PRESCRIBED FOR COUGH AND CONGESTION  IF RASH, SHORTNESS OF BREATH OR TROUBLE SWALLOWING, STOP TAKING THE MEDICATION AND BE SEEN  REST AND DRINK PLENTY OF FLUIDS  IF SYMPTOMS WORSEN OR NEW SYMPTOMS ARISE, RETURN TO THE ER TO BE SEEN
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
## 2019-09-28 ENCOUNTER — HOSPITAL ENCOUNTER (EMERGENCY)
Facility: HOSPITAL | Age: 34
Discharge: HOME | End: 2019-09-28
Attending: EMERGENCY MEDICINE
Payer: COMMERCIAL

## 2019-09-28 VITALS
DIASTOLIC BLOOD PRESSURE: 84 MMHG | WEIGHT: 115.08 LBS | BODY MASS INDEX: 20.39 KG/M2 | TEMPERATURE: 98 F | SYSTOLIC BLOOD PRESSURE: 137 MMHG | OXYGEN SATURATION: 98 % | RESPIRATION RATE: 18 BRPM | HEART RATE: 81 BPM

## 2019-09-28 DIAGNOSIS — J11.1 INFLUENZA-LIKE ILLNESS: Primary | ICD-10-CM

## 2019-09-28 LAB — S PYO AG THROAT QL: NEGATIVE

## 2019-09-28 PROCEDURE — 99283 EMERGENCY DEPT VISIT LOW MDM: CPT

## 2019-09-28 PROCEDURE — 99284 EMERGENCY DEPT VISIT MOD MDM: CPT | Performed by: PHYSICIAN ASSISTANT

## 2019-09-28 PROCEDURE — 87430 STREP A AG IA: CPT | Performed by: PHYSICIAN ASSISTANT
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
## 2019-12-08 ENCOUNTER — HOSPITAL ENCOUNTER (EMERGENCY)
Facility: HOSPITAL | Age: 34
Discharge: HOME | End: 2019-12-08
Attending: EMERGENCY MEDICINE | Admitting: EMERGENCY MEDICINE
Payer: COMMERCIAL

## 2019-12-08 VITALS
SYSTOLIC BLOOD PRESSURE: 168 MMHG | TEMPERATURE: 98.2 F | DIASTOLIC BLOOD PRESSURE: 72 MMHG | HEART RATE: 78 BPM | OXYGEN SATURATION: 99 % | RESPIRATION RATE: 18 BRPM | BODY MASS INDEX: 20.42 KG/M2 | WEIGHT: 115.3 LBS

## 2019-12-08 DIAGNOSIS — J02.9 PHARYNGITIS: Primary | ICD-10-CM

## 2019-12-08 LAB
EXT PREG TEST URINE: NEGATIVE
EXT. CONTROL ED NAV: NORMAL
S PYO DNA THROAT QL NAA+PROBE: NORMAL

## 2019-12-08 PROCEDURE — 96372 THER/PROPH/DIAG INJ SC/IM: CPT

## 2019-12-08 PROCEDURE — 86308 HETEROPHILE ANTIBODY SCREEN: CPT | Performed by: PHYSICIAN ASSISTANT

## 2019-12-08 PROCEDURE — 81025 URINE PREGNANCY TEST: CPT | Performed by: PHYSICIAN ASSISTANT

## 2019-12-08 PROCEDURE — 87651 STREP A DNA AMP PROBE: CPT | Performed by: PHYSICIAN ASSISTANT

## 2019-12-08 PROCEDURE — 99284 EMERGENCY DEPT VISIT MOD MDM: CPT | Performed by: PHYSICIAN ASSISTANT

## 2019-12-08 PROCEDURE — 99283 EMERGENCY DEPT VISIT LOW MDM: CPT

## 2019-12-08 PROCEDURE — 36415 COLL VENOUS BLD VENIPUNCTURE: CPT | Performed by: PHYSICIAN ASSISTANT

## 2019-12-08 RX ORDER — KETOROLAC TROMETHAMINE 30 MG/ML
15 INJECTION, SOLUTION INTRAMUSCULAR; INTRAVENOUS ONCE
Status: COMPLETED | OUTPATIENT
Start: 2019-12-08 | End: 2019-12-08

## 2019-12-08 RX ORDER — IBUPROFEN 600 MG/1
600 TABLET ORAL EVERY 6 HOURS PRN
Qty: 30 TABLET | Refills: 0 | Status: SHIPPED | OUTPATIENT
Start: 2019-12-08 | End: 2022-03-04

## 2019-12-08 RX ORDER — ACETAMINOPHEN 500 MG
500 TABLET ORAL EVERY 6 HOURS PRN
Qty: 30 TABLET | Refills: 0 | Status: SHIPPED | OUTPATIENT
Start: 2019-12-08

## 2019-12-08 RX ORDER — LIDOCAINE HYDROCHLORIDE 20 MG/ML
15 SOLUTION OROPHARYNGEAL ONCE
Status: COMPLETED | OUTPATIENT
Start: 2019-12-08 | End: 2019-12-08

## 2019-12-08 RX ADMIN — LIDOCAINE HYDROCHLORIDE 15 ML: 20 SOLUTION ORAL; TOPICAL at 21:09

## 2019-12-08 RX ADMIN — KETOROLAC TROMETHAMINE 15 MG: 30 INJECTION, SOLUTION INTRAMUSCULAR; INTRAVENOUS at 21:37

## 2019-12-09 LAB — HETEROPH AB SER QL: NEGATIVE
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
## 2021-01-21 ENCOUNTER — HOSPITAL ENCOUNTER (EMERGENCY)
Facility: HOSPITAL | Age: 36
Discharge: HOME | End: 2021-01-21
Attending: EMERGENCY MEDICINE | Admitting: EMERGENCY MEDICINE
Payer: COMMERCIAL

## 2021-01-21 ENCOUNTER — APPOINTMENT (EMERGENCY)
Dept: RADIOLOGY | Facility: HOSPITAL | Age: 36
End: 2021-01-21
Payer: COMMERCIAL

## 2021-01-21 VITALS
RESPIRATION RATE: 18 BRPM | WEIGHT: 120.37 LBS | BODY MASS INDEX: 21.32 KG/M2 | HEART RATE: 114 BPM | OXYGEN SATURATION: 98 % | TEMPERATURE: 98.2 F | SYSTOLIC BLOOD PRESSURE: 142 MMHG | DIASTOLIC BLOOD PRESSURE: 83 MMHG

## 2021-01-21 DIAGNOSIS — S62.643A CLOSED NONDISPLACED FRACTURE OF PROXIMAL PHALANX OF LEFT MIDDLE FINGER, INITIAL ENCOUNTER: Primary | ICD-10-CM

## 2021-01-21 DIAGNOSIS — S60.419A ABRASION OF FINGER, INITIAL ENCOUNTER: ICD-10-CM

## 2021-01-21 PROCEDURE — 26720 TREAT FINGER FRACTURE EACH: CPT | Performed by: PHYSICIAN ASSISTANT

## 2021-01-21 PROCEDURE — 12001 RPR S/N/AX/GEN/TRNK 2.5CM/<: CPT | Performed by: PHYSICIAN ASSISTANT

## 2021-01-21 PROCEDURE — 73130 X-RAY EXAM OF HAND: CPT

## 2021-01-21 PROCEDURE — 99283 EMERGENCY DEPT VISIT LOW MDM: CPT

## 2021-01-21 PROCEDURE — 99284 EMERGENCY DEPT VISIT MOD MDM: CPT | Performed by: PHYSICIAN ASSISTANT

## 2021-01-21 RX ORDER — IBUPROFEN 600 MG/1
600 TABLET ORAL ONCE
Status: COMPLETED | OUTPATIENT
Start: 2021-01-21 | End: 2021-01-21

## 2021-01-21 RX ADMIN — IBUPROFEN 600 MG: 600 TABLET, FILM COATED ORAL at 21:25

## 2021-01-21 NOTE — Clinical Note
Renate Martin was seen and treated in our emergency department on 1/21/2021  Diagnosis:     Nicole Rubio  is off the rest of the shift today  She may return on this date: If you have any questions or concerns, please don't hesitate to call        Linda Hart PA-C    ______________________________           _______________          _______________  Hospital Representative                              Date                                Time
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
## 2021-01-22 NOTE — DISCHARGE INSTRUCTIONS
Return to the ER with any new or worsening of symptoms such as but not limited to increased pain, numbness, weakness, or any other concerning symptoms    Watch for any signs of infection such as but not limited to redness, warmth, swelling surrounding the area, drainage from wound, red streaks extending from wound, fevers, or any other concerning symptoms  Return to the ER immediately if any of these develop  Thank you for allowing us to be part of your care today

## 2021-01-28 ENCOUNTER — OFFICE VISIT (OUTPATIENT)
Dept: OBGYN CLINIC | Facility: MEDICAL CENTER | Age: 36
End: 2021-01-28
Payer: COMMERCIAL

## 2021-01-28 VITALS
BODY MASS INDEX: 21.44 KG/M2 | DIASTOLIC BLOOD PRESSURE: 81 MMHG | HEART RATE: 88 BPM | HEIGHT: 63 IN | RESPIRATION RATE: 16 BRPM | WEIGHT: 121 LBS | SYSTOLIC BLOOD PRESSURE: 149 MMHG

## 2021-01-28 DIAGNOSIS — S67.10XA CRUSHING INJURY OF FINGER, INITIAL ENCOUNTER: Primary | ICD-10-CM

## 2021-01-28 PROCEDURE — 99204 OFFICE O/P NEW MOD 45 MIN: CPT | Performed by: ORTHOPAEDIC SURGERY

## 2021-01-28 NOTE — LETTER
January 28, 2021     Patient: Rosalba Mendoza   YOB: 1985   Date of Visit: 1/28/2021       To Whom it May Concern:    Tio Dang is under my professional care  She was seen in my office on 1/28/2021  She may return to work with the following restrictions: No lifting greater than 20lbs with the left upper extremity  Please allow to wrap the finger for comfort  She will follow up in 4 weeks for updated restrictions  If you have any questions or concerns, please don't hesitate to call           Sincerely,          Xiao Jones MD        CC: No Recipients
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
## 2021-05-04 ENCOUNTER — HOSPITAL ENCOUNTER (EMERGENCY)
Facility: HOSPITAL | Age: 36
Discharge: HOME | End: 2021-05-04
Attending: EMERGENCY MEDICINE | Admitting: EMERGENCY MEDICINE
Payer: COMMERCIAL

## 2021-05-04 VITALS
RESPIRATION RATE: 16 BRPM | OXYGEN SATURATION: 100 % | SYSTOLIC BLOOD PRESSURE: 167 MMHG | TEMPERATURE: 98 F | DIASTOLIC BLOOD PRESSURE: 84 MMHG | HEART RATE: 100 BPM

## 2021-05-04 DIAGNOSIS — K08.89 PAIN, DENTAL: Primary | ICD-10-CM

## 2021-05-04 PROCEDURE — 99283 EMERGENCY DEPT VISIT LOW MDM: CPT

## 2021-05-04 PROCEDURE — 99284 EMERGENCY DEPT VISIT MOD MDM: CPT | Performed by: PHYSICIAN ASSISTANT

## 2021-05-04 RX ORDER — OXYCODONE HYDROCHLORIDE AND ACETAMINOPHEN 5; 325 MG/1; MG/1
1 TABLET ORAL ONCE
Status: COMPLETED | OUTPATIENT
Start: 2021-05-04 | End: 2021-05-04

## 2021-05-04 RX ORDER — AMOXICILLIN 500 MG/1
500 CAPSULE ORAL 3 TIMES DAILY
Qty: 21 CAPSULE | Refills: 0 | Status: SHIPPED | OUTPATIENT
Start: 2021-05-04 | End: 2021-05-11

## 2021-05-04 RX ORDER — NAPROXEN 500 MG/1
500 TABLET ORAL 2 TIMES DAILY PRN
Qty: 30 TABLET | Refills: 0 | Status: SHIPPED | OUTPATIENT
Start: 2021-05-04 | End: 2022-03-04

## 2021-05-04 RX ORDER — LIDOCAINE HYDROCHLORIDE 20 MG/ML
15 SOLUTION OROPHARYNGEAL ONCE
Status: COMPLETED | OUTPATIENT
Start: 2021-05-04 | End: 2021-05-04

## 2021-05-04 RX ADMIN — OXYCODONE HYDROCHLORIDE AND ACETAMINOPHEN 1 TABLET: 5; 325 TABLET ORAL at 07:57

## 2021-05-04 RX ADMIN — LIDOCAINE HYDROCHLORIDE 15 ML: 20 SOLUTION ORAL; TOPICAL at 07:57

## 2021-05-04 NOTE — Clinical Note
Evette Myerskman was seen and treated in our emergency department on 5/4/2021  Diagnosis:     Dory Buince    She may return on this date: 05/05/2021         If you have any questions or concerns, please don't hesitate to call        Ellen Ghotra PA-C    ______________________________           _______________          _______________  Hospital Representative                              Date                                Time

## 2021-05-04 NOTE — DISCHARGE INSTRUCTIONS
Please refer to the attached information for strict return instructions  If symptoms worsen or new symptoms develop please return to the ER  Use prescribed medications as directed  Please schedule follow up with dental clinic as soon as possible
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
## 2021-12-06 ENCOUNTER — APPOINTMENT (EMERGENCY)
Dept: RADIOLOGY | Facility: HOSPITAL | Age: 36
End: 2021-12-06
Payer: COMMERCIAL

## 2021-12-06 ENCOUNTER — HOSPITAL ENCOUNTER (EMERGENCY)
Facility: HOSPITAL | Age: 36
Discharge: HOME | End: 2021-12-06
Attending: EMERGENCY MEDICINE | Admitting: EMERGENCY MEDICINE
Payer: COMMERCIAL

## 2021-12-06 VITALS
TEMPERATURE: 98.5 F | DIASTOLIC BLOOD PRESSURE: 87 MMHG | RESPIRATION RATE: 16 BRPM | HEART RATE: 96 BPM | OXYGEN SATURATION: 98 % | SYSTOLIC BLOOD PRESSURE: 124 MMHG | HEIGHT: 63 IN | WEIGHT: 122.14 LBS | BODY MASS INDEX: 21.64 KG/M2

## 2021-12-06 DIAGNOSIS — R11.0 NAUSEA: Primary | ICD-10-CM

## 2021-12-06 DIAGNOSIS — U07.1 COVID: ICD-10-CM

## 2021-12-06 PROCEDURE — 99283 EMERGENCY DEPT VISIT LOW MDM: CPT

## 2021-12-06 PROCEDURE — 99284 EMERGENCY DEPT VISIT MOD MDM: CPT | Performed by: PHYSICIAN ASSISTANT

## 2021-12-06 PROCEDURE — 71045 X-RAY EXAM CHEST 1 VIEW: CPT

## 2021-12-06 RX ORDER — ONDANSETRON 4 MG/1
4 TABLET, ORALLY DISINTEGRATING ORAL EVERY 6 HOURS PRN
Qty: 20 TABLET | Refills: 0 | Status: SHIPPED | OUTPATIENT
Start: 2021-12-06 | End: 2022-03-04

## 2021-12-06 RX ORDER — ONDANSETRON 4 MG/1
4 TABLET, ORALLY DISINTEGRATING ORAL ONCE
Status: COMPLETED | OUTPATIENT
Start: 2021-12-06 | End: 2021-12-06

## 2021-12-06 RX ORDER — ACETAMINOPHEN 325 MG/1
650 TABLET ORAL ONCE
Status: COMPLETED | OUTPATIENT
Start: 2021-12-06 | End: 2021-12-06

## 2021-12-06 RX ORDER — ONDANSETRON 4 MG/1
4 TABLET, ORALLY DISINTEGRATING ORAL EVERY 6 HOURS PRN
Qty: 20 TABLET | Refills: 0 | Status: SHIPPED | OUTPATIENT
Start: 2021-12-06 | End: 2021-12-06 | Stop reason: SDUPTHER

## 2021-12-06 RX ADMIN — ACETAMINOPHEN 650 MG: 325 TABLET, FILM COATED ORAL at 12:05

## 2021-12-06 RX ADMIN — ONDANSETRON 4 MG: 4 TABLET, ORALLY DISINTEGRATING ORAL at 12:05

## 2022-01-26 ENCOUNTER — HOSPITAL ENCOUNTER (EMERGENCY)
Facility: HOSPITAL | Age: 37
Discharge: HOME | End: 2022-01-26
Attending: EMERGENCY MEDICINE
Payer: COMMERCIAL

## 2022-01-26 VITALS
HEART RATE: 133 BPM | DIASTOLIC BLOOD PRESSURE: 88 MMHG | TEMPERATURE: 100.2 F | OXYGEN SATURATION: 98 % | RESPIRATION RATE: 20 BRPM | SYSTOLIC BLOOD PRESSURE: 158 MMHG

## 2022-01-26 DIAGNOSIS — T78.40XA ACUTE ALLERGIC REACTION, INITIAL ENCOUNTER: Primary | ICD-10-CM

## 2022-01-26 PROCEDURE — 99283 EMERGENCY DEPT VISIT LOW MDM: CPT

## 2022-01-26 PROCEDURE — 96361 HYDRATE IV INFUSION ADD-ON: CPT

## 2022-01-26 PROCEDURE — 96375 TX/PRO/DX INJ NEW DRUG ADDON: CPT

## 2022-01-26 PROCEDURE — 99284 EMERGENCY DEPT VISIT MOD MDM: CPT | Performed by: EMERGENCY MEDICINE

## 2022-01-26 PROCEDURE — 96374 THER/PROPH/DIAG INJ IV PUSH: CPT

## 2022-01-26 PROCEDURE — 94640 AIRWAY INHALATION TREATMENT: CPT

## 2022-01-26 RX ORDER — DIPHENHYDRAMINE HYDROCHLORIDE 50 MG/ML
50 INJECTION INTRAMUSCULAR; INTRAVENOUS ONCE
Status: COMPLETED | OUTPATIENT
Start: 2022-01-26 | End: 2022-01-26

## 2022-01-26 RX ORDER — DIPHENHYDRAMINE HCL 25 MG
25 TABLET ORAL EVERY 6 HOURS PRN
Qty: 30 TABLET | Refills: 0 | Status: SHIPPED | OUTPATIENT
Start: 2022-01-26 | End: 2022-03-04

## 2022-01-26 RX ORDER — PREDNISONE 50 MG/1
50 TABLET ORAL DAILY
Qty: 3 TABLET | Refills: 0 | Status: SHIPPED | OUTPATIENT
Start: 2022-01-27 | End: 2022-03-04

## 2022-01-26 RX ORDER — METHYLPREDNISOLONE SODIUM SUCCINATE 125 MG/2ML
125 INJECTION, POWDER, LYOPHILIZED, FOR SOLUTION INTRAMUSCULAR; INTRAVENOUS ONCE
Status: COMPLETED | OUTPATIENT
Start: 2022-01-26 | End: 2022-01-26

## 2022-01-26 RX ORDER — IPRATROPIUM BROMIDE AND ALBUTEROL SULFATE 2.5; .5 MG/3ML; MG/3ML
3 SOLUTION RESPIRATORY (INHALATION) ONCE
Status: COMPLETED | OUTPATIENT
Start: 2022-01-26 | End: 2022-01-26

## 2022-01-26 RX ADMIN — FAMOTIDINE 20 MG: 10 INJECTION INTRAVENOUS at 17:44

## 2022-01-26 RX ADMIN — METHYLPREDNISOLONE SODIUM SUCCINATE 125 MG: 125 INJECTION, POWDER, FOR SOLUTION INTRAMUSCULAR; INTRAVENOUS at 17:44

## 2022-01-26 RX ADMIN — DIPHENHYDRAMINE HYDROCHLORIDE 50 MG: 50 INJECTION, SOLUTION INTRAMUSCULAR; INTRAVENOUS at 17:44

## 2022-01-26 RX ADMIN — SODIUM CHLORIDE 1000 ML: 0.9 INJECTION, SOLUTION INTRAVENOUS at 17:46

## 2022-01-26 RX ADMIN — IPRATROPIUM BROMIDE AND ALBUTEROL SULFATE 3 ML: 2.5; .5 SOLUTION RESPIRATORY (INHALATION) at 17:44
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
## 2022-02-22 ENCOUNTER — TELEPHONE (OUTPATIENT)
Dept: FAMILY MEDICINE CLINIC | Facility: CLINIC | Age: 37
End: 2022-02-22

## 2022-02-22 NOTE — TELEPHONE ENCOUNTER
Patient called in regards to making appointment  She stated she was in the process of changing her pcp with insurance and will call us to schedule appointment

## 2022-03-04 ENCOUNTER — OFFICE VISIT (OUTPATIENT)
Dept: FAMILY MEDICINE CLINIC | Facility: CLINIC | Age: 37
End: 2022-03-04

## 2022-03-04 VITALS
HEART RATE: 87 BPM | TEMPERATURE: 97.6 F | WEIGHT: 125 LBS | SYSTOLIC BLOOD PRESSURE: 126 MMHG | RESPIRATION RATE: 18 BRPM | DIASTOLIC BLOOD PRESSURE: 74 MMHG | OXYGEN SATURATION: 98 % | HEIGHT: 63 IN | BODY MASS INDEX: 22.15 KG/M2

## 2022-03-04 DIAGNOSIS — T78.1XXA ALLERGIC REACTION TO TREE NUT: ICD-10-CM

## 2022-03-04 DIAGNOSIS — Z12.31 ENCOUNTER FOR SCREENING MAMMOGRAM FOR MALIGNANT NEOPLASM OF BREAST: ICD-10-CM

## 2022-03-04 DIAGNOSIS — F33.2 SEVERE EPISODE OF RECURRENT MAJOR DEPRESSIVE DISORDER, WITHOUT PSYCHOTIC FEATURES (HCC): Primary | ICD-10-CM

## 2022-03-04 DIAGNOSIS — F17.210 CIGARETTE NICOTINE DEPENDENCE WITHOUT COMPLICATION: ICD-10-CM

## 2022-03-04 DIAGNOSIS — Z00.00 ANNUAL PHYSICAL EXAM: ICD-10-CM

## 2022-03-04 DIAGNOSIS — Z80.3 FAMILY HISTORY OF BREAST CANCER IN FIRST DEGREE RELATIVE: ICD-10-CM

## 2022-03-04 PROCEDURE — 99385 PREV VISIT NEW AGE 18-39: CPT | Performed by: NURSE PRACTITIONER

## 2022-03-04 RX ORDER — NICOTINE 21 MG/24HR
1 PATCH, TRANSDERMAL 24 HOURS TRANSDERMAL EVERY 24 HOURS
Qty: 28 PATCH | Refills: 0 | Status: SHIPPED | OUTPATIENT
Start: 2022-03-04

## 2022-03-04 NOTE — PATIENT INSTRUCTIONS
If you remember, please have [facility] fax me your bloodwork  Wellness Visit for Adults   AMBULATORY CARE:   A wellness visit  is when you see your healthcare provider to get screened for health problems  Your healthcare provider will also give you advice on how to stay healthy  Write down your questions so you remember to ask them  Ask your healthcare provider how often you should have a wellness visit  What happens at a wellness visit:  Your healthcare provider will ask about your health, and your family history of health problems  This includes high blood pressure, heart disease, and cancer  He or she will ask if you have symptoms that concern you, if you smoke, and about your mood  You may also be asked about your intake of medicines, supplements, food, and alcohol  Any of the following may be done:  · Your weight  will be checked  Your height may also be checked so your body mass index (BMI) can be calculated  Your BMI shows if you are at a healthy weight  · Your blood pressure  and heart rate will be checked  Your temperature may also be checked  · Blood and urine tests  may be done  Blood tests may be done to check your cholesterol levels  Abnormal cholesterol levels increase your risk for heart disease and stroke  You may also need a blood or urine test to check for diabetes if you are at increased risk  Urine tests may be done to look for signs of an infection or kidney disease  · A physical exam  includes checking your heartbeat and lungs with a stethoscope  Your healthcare provider may also check your skin to look for sun damage  · Screening tests  may be recommended  A screening test is done to check for diseases that may not cause symptoms  The screening tests you may need depend on your age, gender, family history, and lifestyle habits  For example, colorectal screening may be recommended if you are 48years old or older      Screening tests you need if you are a woman:   · A Pap smear is used to screen for cervical cancer  Pap smears are usually done every 3 to 5 years depending on your age  You may need them more often if you have had abnormal Pap smear test results in the past  Ask your healthcare provider how often you should have a Pap smear  · A mammogram  is an x-ray of your breasts to screen for breast cancer  Experts recommend mammograms every 2 years starting at age 48 years  You may need a mammogram at age 52 years or younger if you have an increased risk for breast cancer  Talk to your healthcare provider about when you should start having mammograms and how often you need them  Vaccines you may need:   · Get an influenza vaccine  every year  The influenza vaccine protects you from the flu  Several types of viruses cause the flu  The viruses change over time, so new vaccines are made each year  · Get a tetanus-diphtheria (Td) booster vaccine  every 10 years  This vaccine protects you against tetanus and diphtheria  Tetanus is a severe infection that may cause painful muscle spasms and lockjaw  Diphtheria is a severe bacterial infection that causes a thick covering in the back of your mouth and throat  · Get a human papillomavirus (HPV) vaccine  if you are female and aged 23 to 32 or male 23 to 24 and never received it  This vaccine protects you from HPV infection  HPV is the most common infection spread by sexual contact  HPV may also cause vaginal, penile, and anal cancers  · Get a pneumococcal vaccine  if you are aged 72 years or older  The pneumococcal vaccine is an injection given to protect you from pneumococcal disease  Pneumococcal disease is an infection caused by pneumococcal bacteria  The infection may cause pneumonia, meningitis, or an ear infection  · Get a shingles vaccine  if you are 60 or older, even if you have had shingles before  The shingles vaccine is an injection to protect you from the varicella-zoster virus   This is the same virus that causes chickenpox  Shingles is a painful rash that develops in people who had chickenpox or have been exposed to the virus  How to eat healthy:  My Plate is a model for planning healthy meals  It shows the types and amounts of foods that should go on your plate  Fruits and vegetables make up about half of your plate, and grains and protein make up the other half  A serving of dairy is included on the side of your plate  The amount of calories and serving sizes you need depends on your age, gender, weight, and height  Examples of healthy foods are listed below:  · Eat a variety of vegetables  such as dark green, red, and orange vegetables  You can also include canned vegetables low in sodium (salt) and frozen vegetables without added butter or sauces  · Eat a variety of fresh fruits , canned fruit in 100% juice, frozen fruit, and dried fruit  · Include whole grains  At least half of the grains you eat should be whole grains  Examples include whole-wheat bread, wheat pasta, brown rice, and whole-grain cereals such as oatmeal     · Eat a variety of protein foods such as seafood (fish and shellfish), lean meat, and poultry without skin (turkey and chicken)  Examples of lean meats include pork leg, shoulder, or tenderloin, and beef round, sirloin, tenderloin, and extra lean ground beef  Other protein foods include eggs and egg substitutes, beans, peas, soy products, nuts, and seeds  · Choose low-fat dairy products such as skim or 1% milk or low-fat yogurt, cheese, and cottage cheese  · Limit unhealthy fats  such as butter, hard margarine, and shortening  Exercise:  Exercise at least 30 minutes per day on most days of the week  Some examples of exercise include walking, biking, dancing, and swimming  You can also fit in more physical activity by taking the stairs instead of the elevator or parking farther away from stores  Include muscle strengthening activities 2 days each week   Regular exercise provides many health benefits  It helps you manage your weight, and decreases your risk for type 2 diabetes, heart disease, stroke, and high blood pressure  Exercise can also help improve your mood  Ask your healthcare provider about the best exercise plan for you  General health and safety guidelines:   · Do not smoke  Nicotine and other chemicals in cigarettes and cigars can cause lung damage  Ask your healthcare provider for information if you currently smoke and need help to quit  E-cigarettes or smokeless tobacco still contain nicotine  Talk to your healthcare provider before you use these products  · Limit alcohol  A drink of alcohol is 12 ounces of beer, 5 ounces of wine, or 1½ ounces of liquor  · Lose weight, if needed  Being overweight increases your risk of certain health conditions  These include heart disease, high blood pressure, type 2 diabetes, and certain types of cancer  · Protect your skin  Do not sunbathe or use tanning beds  Use sunscreen with a SPF 15 or higher  Apply sunscreen at least 15 minutes before you go outside  Reapply sunscreen every 2 hours  Wear protective clothing, hats, and sunglasses when you are outside  · Drive safely  Always wear your seatbelt  Make sure everyone in your car wears a seatbelt  A seatbelt can save your life if you are in an accident  Do not use your cell phone when you are driving  This could distract you and cause an accident  Pull over if you need to make a call or send a text message  · Practice safe sex  Use latex condoms if are sexually active and have more than one partner  Your healthcare provider may recommend screening tests for sexually transmitted infections (STIs)  · Wear helmets, lifejackets, and protective gear  Always wear a helmet when you ride a bike or motorcycle, go skiing, or play sports that could cause a head injury  Wear protective equipment when you play sports   Wear a lifejacket when you are on a boat or doing water sports  © Copyright [employer] 2022 Information is for End User's use only and may not be sold, redistributed or otherwise used for commercial purposes  All illustrations and images included in CareNotes® are the copyrighted property of A D A M , Inc  or Queenie Velazco  The above information is an  only  It is not intended as medical advice for individual conditions or treatments  Talk to your doctor, nurse or pharmacist before following any medical regimen to see if it is safe and effective for you  Cigarette Smoking and Your Health   AMBULATORY CARE:   Risks to your health if you smoke:  Nicotine and other chemicals found in tobacco and e-cigarettes can damage every cell in your body  Even if you are a light smoker, you have an increased risk for cancer, heart disease, and lung disease  If you are pregnant or have diabetes, smoking increases your risk for complications  Nicotine can affect an adolescent's developing brain  This can lead to trouble thinking, learning, or paying attention  Benefits to your health if you stop smoking:   · You decrease respiratory symptoms such as coughing, wheezing, and shortness of breath  · You reduce your risk for cancers of the lung, mouth, throat, kidney, bladder, pancreas, stomach, and cervix  If you already have cancer, you increase the benefits of chemotherapy  You also reduce your risk for cancer returning or a second cancer from developing  · You reduce your risk for heart disease, blood clots, heart attack, and stroke  · You reduce your risk for lung infections, and diseases such as pneumonia, asthma, chronic bronchitis, and emphysema  · Your circulation improves  More oxygen can be delivered to your body  If you have diabetes, you lower your risk for complications, such as kidney, artery, and eye diseases  You also lower your risk for nerve damage  Nerve damage can lead to amputations, poor vision, and blindness      · You improve your body's ability to heal and to fight infections  · An adolescent can help his or her brain and body develop in a healthy way  Talk to your adolescent about all the health risks of nicotine  If you can, start talking about nicotine when your child is younger than 12 years  This may make it easier for him or her not to start using nicotine as a teenager or adult  Explain to him or her that it is best never to start  It can be hard to try to quit later  Benefits to the health of others if you stop smoking:  Tobacco is harmful to nonsmokers who breathe in your secondhand smoke  The following are ways the health of others around you may improve when you stop smoking:  · You lower the risks for lung cancer and heart disease in nonsmoking adults  · If you are pregnant, you lower the risk for miscarriage, early delivery, low birth weight, and stillbirth  You also lower your baby's risk for SIDS, obesity, developmental delay, and neurobehavioral problems, such as ADHD  · If you have children, you lower their risk for ear infections, colds, pneumonia, bronchitis, and asthma  Follow up with your doctor as directed:  Write down your questions so you remember to ask them during your visits  For support and more information:   · American [employer] Association  [address] [facility],[address]  [address]  Phone: [facility] 041  Phone: 3- 353 - 520-7612  Web Address: Lillie Dalton  [employer]    · Smokefree  gov  Phone: 7- 504 - 845-9362  Web Address: www smokefree  [facility] 21 2022 Information is for End User's use only and may not be sold, redistributed or otherwise used for commercial purposes  All illustrations and images included in CareNotes® are the copyrighted property of [employer]  or [address] [place], [place] [address]luis   The above information is an  only  It is not intended as medical advice for individual conditions or treatments   Talk to your doctor, nurse or pharmacist before following any medical regimen to see if it is safe and effective for you

## 2022-03-04 NOTE — ASSESSMENT & PLAN NOTE
Mother had breast cancer in 52's  Also some aunts had breast cancer  I suggest to Jimmie Gipson that she start mammograms now due to first degree relative

## 2022-03-04 NOTE — ASSESSMENT & PLAN NOTE
Established with [facility], they ordered comprehensive bloodwork; i'm asking Gabriella Montoya to have them fax me all those results

## 2022-04-01 ENCOUNTER — APPOINTMENT (EMERGENCY)
Dept: CT IMAGING | Facility: HOSPITAL | Age: 37
End: 2022-04-01
Payer: COMMERCIAL

## 2022-04-01 ENCOUNTER — HOSPITAL ENCOUNTER (EMERGENCY)
Facility: HOSPITAL | Age: 37
Discharge: HOME | End: 2022-04-01
Attending: EMERGENCY MEDICINE
Payer: COMMERCIAL

## 2022-04-01 VITALS
OXYGEN SATURATION: 98 % | BODY MASS INDEX: 21.31 KG/M2 | TEMPERATURE: 98.3 F | WEIGHT: 120.3 LBS | RESPIRATION RATE: 18 BRPM | HEART RATE: 105 BPM | DIASTOLIC BLOOD PRESSURE: 70 MMHG | SYSTOLIC BLOOD PRESSURE: 104 MMHG

## 2022-04-01 DIAGNOSIS — S09.90XA INJURY OF HEAD, INITIAL ENCOUNTER: ICD-10-CM

## 2022-04-01 DIAGNOSIS — S02.609A MANDIBULAR FRACTURE (HCC): ICD-10-CM
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
## 2022-04-01 LAB
EXT PREG TEST URINE: NEGATIVE
EXT. CONTROL ED NAV: NORMAL

## 2022-04-01 PROCEDURE — 81025 URINE PREGNANCY TEST: CPT | Performed by: EMERGENCY MEDICINE

## 2022-04-01 PROCEDURE — 99284 EMERGENCY DEPT VISIT MOD MDM: CPT | Performed by: EMERGENCY MEDICINE

## 2022-04-01 PROCEDURE — 70450 CT HEAD/BRAIN W/O DYE: CPT

## 2022-04-01 PROCEDURE — G1004 CDSM NDSC: HCPCS

## 2022-04-01 PROCEDURE — 99284 EMERGENCY DEPT VISIT MOD MDM: CPT

## 2022-04-01 PROCEDURE — 70486 CT MAXILLOFACIAL W/O DYE: CPT

## 2022-04-01 RX ORDER — IBUPROFEN 600 MG/1
600 TABLET ORAL EVERY 6 HOURS PRN
Qty: 30 TABLET | Refills: 0 | Status: SHIPPED | OUTPATIENT
Start: 2022-04-01

## 2022-04-01 RX ORDER — ACETAMINOPHEN 325 MG/1
975 TABLET ORAL ONCE
Status: COMPLETED | OUTPATIENT
Start: 2022-04-01 | End: 2022-04-01

## 2022-04-01 RX ADMIN — ACETAMINOPHEN 325MG 975 MG: 325 TABLET ORAL at 06:26
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
## 2022-04-01 NOTE — ED NOTES
Patient reports that maybe she used 1 of 2 things of cocaine tonight as well  Patient cries when she opens her mouth       Ryan Chacon RN  04/01/22 [facility] Lou Almanaz RN  04/01/22 1105
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
## 2022-08-02 ENCOUNTER — HOSPITAL ENCOUNTER (EMERGENCY)
Facility: HOSPITAL | Age: 37
Discharge: HOME | End: 2022-08-02
Attending: EMERGENCY MEDICINE | Admitting: EMERGENCY MEDICINE
Payer: COMMERCIAL

## 2022-08-02 VITALS
BODY MASS INDEX: 20.74 KG/M2 | SYSTOLIC BLOOD PRESSURE: 119 MMHG | RESPIRATION RATE: 20 BRPM | DIASTOLIC BLOOD PRESSURE: 68 MMHG | HEART RATE: 79 BPM | TEMPERATURE: 97.9 F | OXYGEN SATURATION: 99 % | WEIGHT: 117.1 LBS

## 2022-08-02 DIAGNOSIS — H72.92 PERFORATION OF LEFT TYMPANIC MEMBRANE: Primary | ICD-10-CM

## 2022-08-02 PROCEDURE — 99282 EMERGENCY DEPT VISIT SF MDM: CPT

## 2022-08-02 PROCEDURE — 99284 EMERGENCY DEPT VISIT MOD MDM: CPT | Performed by: PHYSICIAN ASSISTANT

## 2022-08-02 RX ORDER — OFLOXACIN 3 MG/ML
10 SOLUTION AURICULAR (OTIC) DAILY
Qty: 10 ML | Refills: 0 | Status: SHIPPED | OUTPATIENT
Start: 2022-08-02 | End: 2022-08-09
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
## 2023-02-10 ENCOUNTER — OFFICE VISIT (OUTPATIENT)
Dept: FAMILY MEDICINE CLINIC | Facility: CLINIC | Age: 38
End: 2023-02-10

## 2023-02-10 ENCOUNTER — APPOINTMENT (OUTPATIENT)
Dept: LAB | Facility: HOSPITAL | Age: 38
End: 2023-02-10

## 2023-02-10 VITALS
HEIGHT: 63 IN | BODY MASS INDEX: 19.79 KG/M2 | HEART RATE: 91 BPM | SYSTOLIC BLOOD PRESSURE: 112 MMHG | RESPIRATION RATE: 22 BRPM | DIASTOLIC BLOOD PRESSURE: 80 MMHG | TEMPERATURE: 98.3 F | OXYGEN SATURATION: 99 % | WEIGHT: 111.7 LBS

## 2023-02-10 DIAGNOSIS — F31.89 OTHER BIPOLAR DISORDER (HCC): ICD-10-CM

## 2023-02-10 DIAGNOSIS — Z02.1 PRE-EMPLOYMENT EXAMINATION: ICD-10-CM

## 2023-02-10 DIAGNOSIS — Z02.1 PRE-EMPLOYMENT EXAMINATION: Primary | ICD-10-CM

## 2023-02-10 DIAGNOSIS — Z72.0 TOBACCO USE: ICD-10-CM

## 2023-02-10 DIAGNOSIS — F33.2 SEVERE EPISODE OF RECURRENT MAJOR DEPRESSIVE DISORDER, WITHOUT PSYCHOTIC FEATURES (HCC): ICD-10-CM

## 2023-02-10 LAB
ALBUMIN SERPL BCP-MCNC: 3.9 G/DL (ref 3.5–5)
ALP SERPL-CCNC: 103 U/L (ref 43–122)
ALT SERPL W P-5'-P-CCNC: 22 U/L
ANION GAP SERPL CALCULATED.3IONS-SCNC: 5 MMOL/L (ref 5–14)
AST SERPL W P-5'-P-CCNC: 29 U/L (ref 14–36)
BASOPHILS # BLD AUTO: 0.05 THOUSANDS/ÂΜL (ref 0–0.1)
BASOPHILS NFR BLD AUTO: 1 % (ref 0–1)
BILIRUB SERPL-MCNC: 0.28 MG/DL (ref 0.2–1)
BUN SERPL-MCNC: 8 MG/DL (ref 5–25)
CALCIUM SERPL-MCNC: 8.6 MG/DL (ref 8.4–10.2)
CHLORIDE SERPL-SCNC: 106 MMOL/L (ref 96–108)
CO2 SERPL-SCNC: 29 MMOL/L (ref 21–32)
CREAT SERPL-MCNC: 0.72 MG/DL (ref 0.6–1.2)
EOSINOPHIL # BLD AUTO: 0.14 THOUSAND/ÂΜL (ref 0–0.61)
EOSINOPHIL NFR BLD AUTO: 2 % (ref 0–6)
ERYTHROCYTE [DISTWIDTH] IN BLOOD BY AUTOMATED COUNT: 13.5 % (ref 11.6–15.1)
GFR SERPL CREATININE-BSD FRML MDRD: 105 ML/MIN/1.73SQ M
GLUCOSE SERPL-MCNC: 71 MG/DL (ref 70–99)
HCT VFR BLD AUTO: 39.3 % (ref 34.8–46.1)
HGB BLD-MCNC: 12.4 G/DL (ref 11.5–15.4)
IMM GRANULOCYTES # BLD AUTO: 0.01 THOUSAND/UL (ref 0–0.2)
IMM GRANULOCYTES NFR BLD AUTO: 0 % (ref 0–2)
LYMPHOCYTES # BLD AUTO: 2.6 THOUSANDS/ÂΜL (ref 0.6–4.47)
LYMPHOCYTES NFR BLD AUTO: 36 % (ref 14–44)
MCH RBC QN AUTO: 27.9 PG (ref 26.8–34.3)
MCHC RBC AUTO-ENTMCNC: 31.6 G/DL (ref 31.4–37.4)
MCV RBC AUTO: 89 FL (ref 82–98)
MONOCYTES # BLD AUTO: 0.55 THOUSAND/ÂΜL (ref 0.17–1.22)
MONOCYTES NFR BLD AUTO: 8 % (ref 4–12)
NEUTROPHILS # BLD AUTO: 3.82 THOUSANDS/ÂΜL (ref 1.85–7.62)
NEUTS SEG NFR BLD AUTO: 53 % (ref 43–75)
NRBC BLD AUTO-RTO: 0 /100 WBCS
PLATELET # BLD AUTO: 362 THOUSANDS/UL (ref 149–390)
PMV BLD AUTO: 9.2 FL (ref 8.9–12.7)
POTASSIUM SERPL-SCNC: 3.7 MMOL/L (ref 3.5–5.3)
PROT SERPL-MCNC: 6.9 G/DL (ref 6.4–8.4)
RBC # BLD AUTO: 4.44 MILLION/UL (ref 3.81–5.12)
SODIUM SERPL-SCNC: 140 MMOL/L (ref 135–147)
WBC # BLD AUTO: 7.17 THOUSAND/UL (ref 4.31–10.16)
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
## 2023-02-11 LAB
HBV CORE AB SER QL: NORMAL
HBV CORE IGM SER QL: NORMAL
HBV E AB SERPL QL IA: NEGATIVE
HBV E AG SERPL QL IA: NEGATIVE
HBV SURFACE AB SER-ACNC: 701.02 MIU/ML
HBV SURFACE AG SER QL: NORMAL
MEV IGG SER IA-ACNC: >300 AU/ML
MUV IGG SER IA-ACNC: 64.8 AU/ML
RUBV IGG SERPL IA-ACNC: 2.45 INDEX
VZV IGG SER QL IA: NORMAL

## 2023-02-13 ENCOUNTER — CLINICAL SUPPORT (OUTPATIENT)
Dept: FAMILY MEDICINE CLINIC | Facility: CLINIC | Age: 38
End: 2023-02-13

## 2023-02-13 DIAGNOSIS — Z11.1 ENCOUNTER FOR PPD SKIN TEST READING: Primary | ICD-10-CM

## 2023-02-13 LAB
INDURATION: 0 MM
TB SKIN TEST: NEGATIVE

## 2023-02-15 PROBLEM — Z02.1 PRE-EMPLOYMENT EXAMINATION: Status: ACTIVE | Noted: 2023-02-15

## 2023-02-15 PROBLEM — Z72.0 TOBACCO USE: Status: ACTIVE | Noted: 2023-02-15

## 2023-02-15 NOTE — ASSESSMENT & PLAN NOTE
Pt with history of psychiatric illness resulting in hospitalization with no current treatment  Denies need for medications at this time but reports interest in therapy, last session 2 years ago  - Referral placed for [facility] Psychiatry, pt provided with list of community [facility] [place] [facility] resources

## 2023-02-15 NOTE — ASSESSMENT & PLAN NOTE
Tobacco Cessation Counseling: Tobacco cessation counseling was provided  The patient is sincerely urged to quit consumption of tobacco  She is not ready to quit tobacco  Currently smoking 5 cigarettes every 2 days  Gradually cutting down, not ready to quit completely

## 2023-03-07 ENCOUNTER — TELEPHONE (OUTPATIENT)
Dept: PSYCHIATRY | Facility: CLINIC | Age: 38
End: 2023-03-07

## 2023-03-07 NOTE — TELEPHONE ENCOUNTER
Contacted patient in regards to routine referral and placing patient on proper wait list, unable to lvm due to vm box is full

## 2023-03-10 NOTE — TELEPHONE ENCOUNTER
Contacted Patient in regards to routine referral and placing patient on proper wait list, lvm for patient to contact intake department

## 2023-03-17 NOTE — TELEPHONE ENCOUNTER
Was calling pt in regards to routine referral and adding to proper wait list  Unable to leave message due to VM being full  Third attempt to reach pt  Referral closed

## 2023-07-15 ENCOUNTER — HOSPITAL ENCOUNTER (EMERGENCY)
Facility: HOSPITAL | Age: 38
Discharge: HOME | End: 2023-07-15
Attending: EMERGENCY MEDICINE

## 2023-07-15 ENCOUNTER — APPOINTMENT (EMERGENCY)
Dept: RADIOLOGY | Facility: HOSPITAL | Age: 38
End: 2023-07-15

## 2023-07-15 VITALS
RESPIRATION RATE: 16 BRPM | WEIGHT: 105.82 LBS | DIASTOLIC BLOOD PRESSURE: 86 MMHG | TEMPERATURE: 98.1 F | SYSTOLIC BLOOD PRESSURE: 141 MMHG | BODY MASS INDEX: 18.75 KG/M2 | HEART RATE: 89 BPM | OXYGEN SATURATION: 100 %

## 2023-07-15 DIAGNOSIS — S90.31XA CONTUSION OF RIGHT FOOT, INITIAL ENCOUNTER: Primary | ICD-10-CM

## 2023-07-15 LAB
EXT PREGNANCY TEST URINE: NEGATIVE
EXT. CONTROL: NORMAL

## 2023-07-15 PROCEDURE — 73630 X-RAY EXAM OF FOOT: CPT

## 2023-07-15 PROCEDURE — 96372 THER/PROPH/DIAG INJ SC/IM: CPT

## 2023-07-15 PROCEDURE — 99283 EMERGENCY DEPT VISIT LOW MDM: CPT

## 2023-07-15 PROCEDURE — 81025 URINE PREGNANCY TEST: CPT

## 2023-07-15 RX ORDER — KETOROLAC TROMETHAMINE 30 MG/ML
15 INJECTION, SOLUTION INTRAMUSCULAR; INTRAVENOUS ONCE
Status: COMPLETED | OUTPATIENT
Start: 2023-07-15 | End: 2023-07-15

## 2023-07-15 RX ORDER — NAPROXEN 500 MG/1
500 TABLET ORAL 2 TIMES DAILY WITH MEALS
Qty: 10 TABLET | Refills: 0 | Status: SHIPPED | OUTPATIENT
Start: 2023-07-15 | End: 2024-07-14

## 2023-07-15 RX ADMIN — KETOROLAC TROMETHAMINE 15 MG: 30 INJECTION, SOLUTION INTRAMUSCULAR; INTRAVENOUS at 17:06
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
## 2023-10-13 ENCOUNTER — OFFICE VISIT (OUTPATIENT)
Dept: FAMILY MEDICINE CLINIC | Facility: CLINIC | Age: 38
End: 2023-10-13

## 2023-10-13 VITALS
BODY MASS INDEX: 19.31 KG/M2 | TEMPERATURE: 96.7 F | SYSTOLIC BLOOD PRESSURE: 120 MMHG | HEIGHT: 63 IN | HEART RATE: 110 BPM | RESPIRATION RATE: 16 BRPM | WEIGHT: 109 LBS | DIASTOLIC BLOOD PRESSURE: 80 MMHG | OXYGEN SATURATION: 97 %

## 2023-10-13 DIAGNOSIS — Z23 ENCOUNTER FOR IMMUNIZATION: ICD-10-CM

## 2023-10-13 DIAGNOSIS — Z12.31 ENCOUNTER FOR SCREENING MAMMOGRAM FOR MALIGNANT NEOPLASM OF BREAST: ICD-10-CM

## 2023-10-13 DIAGNOSIS — Z87.898 HISTORY OF ALCOHOL USE: ICD-10-CM

## 2023-10-13 DIAGNOSIS — Z72.0 TOBACCO USE: ICD-10-CM

## 2023-10-13 DIAGNOSIS — Z00.00 ANNUAL PHYSICAL EXAM: Primary | ICD-10-CM

## 2023-10-13 DIAGNOSIS — F33.1 MODERATE EPISODE OF RECURRENT MAJOR DEPRESSIVE DISORDER (HCC): ICD-10-CM

## 2023-10-13 DIAGNOSIS — Z80.3 FAMILY HISTORY OF BREAST CANCER IN FIRST DEGREE RELATIVE: ICD-10-CM

## 2023-10-13 PROCEDURE — 90686 IIV4 VACC NO PRSV 0.5 ML IM: CPT

## 2023-10-13 PROCEDURE — 90471 IMMUNIZATION ADMIN: CPT

## 2023-10-13 PROCEDURE — 99396 PREV VISIT EST AGE 40-64: CPT

## 2023-10-13 RX ORDER — FLUOXETINE 10 MG/1
10 CAPSULE ORAL DAILY
Qty: 30 CAPSULE | Refills: 2 | Status: SHIPPED | OUTPATIENT
Start: 2023-10-13

## 2023-10-13 RX ORDER — OLANZAPINE 2.5 MG/1
2.5 TABLET ORAL
Qty: 30 TABLET | Refills: 2 | Status: SHIPPED | OUTPATIENT
Start: 2023-10-13

## 2023-10-13 NOTE — ASSESSMENT & PLAN NOTE
Has not purchased cigarettes since January, but still smoking.  - Validated pt's efforts to cut down, encouraged complete cessation. Tobacco Cessation Counseling: Tobacco cessation counseling was provided.  The patient is sincerely urged to quit consumption of tobacco. She is not ready to quit tobacco.

## 2023-10-13 NOTE — ASSESSMENT & PLAN NOTE
Depression, anxiety, mood lability. Denies SI/HI, A/V hallucinations. Thought content appropriate. - Prozac 10 mg daily, Zyprexa 2.5 mg daily to aid in mood stabilization.  - Pt given list of local [facility] providers.  - Follow up 4 weeks.

## 2023-10-13 NOTE — PATIENT INSTRUCTIONS
Wellness Visit for Adults   WHAT YOU NEED TO KNOW:   What is a wellness visit? A wellness visit is when you see your healthcare provider to get screened for health problems. Your healthcare provider will also give you advice on how to stay healthy. Write down your questions so you remember to ask them. Ask your healthcare provider how often you should have a wellness visit. What happens at a wellness visit? Your healthcare provider will ask about your health, and your family history of health problems. This includes high blood pressure, heart disease, and cancer. He or she will ask if you have symptoms that concern you, if you smoke, and about your mood. You may also be asked about your intake of medicines, supplements, food, and alcohol. Any of the following may be done: Your weight  will be checked. Your height may also be checked so your body mass index (BMI) can be calculated. Your BMI shows if you are at a healthy weight. Your blood pressure  and heart rate will be checked. Your temperature may also be checked. Blood and urine tests  may be done. Blood tests may be done to check your cholesterol levels. Abnormal cholesterol levels increase your risk for heart disease and stroke. You may also need a blood or urine test to check for diabetes if you are at increased risk. Urine tests may be done to look for signs of an infection or kidney disease. A physical exam  includes checking your heartbeat and lungs with a stethoscope. Your healthcare provider may also check your skin to look for sun damage. Screening tests  may be recommended. A screening test is done to check for diseases that may not cause symptoms. The screening tests you may need depend on your age, gender, family history, and lifestyle habits. For example, colorectal screening may be recommended if you are 48years old or older. What screening tests do I need if I am a woman? A Pap smear  is used to screen for cervical cancer.  Pap smears are usually done every 3 to 5 years depending on your age. You may need them more often if you have had abnormal Pap smear test results in the past. Ask your healthcare provider how often you should have a Pap smear. A mammogram  is an x-ray of your breasts to screen for breast cancer. Experts recommend mammograms every 2 years starting at age 48 years. You may need a mammogram at age 52 years or younger if you have an increased risk for breast cancer. Talk to your healthcare provider about when you should start having mammograms and how often you need them. What vaccines might I need? Get an influenza vaccine  every year. The influenza vaccine protects you from the flu. Several types of viruses cause the flu. The viruses change over time, so new vaccines are made each year. Get a tetanus-diphtheria (Td) booster vaccine  every 10 years. This vaccine protects you against tetanus and diphtheria. Tetanus is a severe infection that may cause painful muscle spasms and lockjaw. Diphtheria is a severe bacterial infection that causes a thick covering in the back of your mouth and throat. Get a human papillomavirus (HPV) vaccine  if you are female and aged 23 to 32 or male 23 to 24 and never received it. This vaccine protects you from HPV infection. HPV is the most common infection spread by sexual contact. HPV may also cause vaginal, penile, and anal cancers. Get a pneumococcal vaccine  if you are aged 72 years or older. The pneumococcal vaccine is an injection given to protect you from pneumococcal disease. Pneumococcal disease is an infection caused by pneumococcal bacteria. The infection may cause pneumonia, meningitis, or an ear infection. Get a shingles vaccine  if you are 60 or older, even if you have had shingles before. The shingles vaccine is an injection to protect you from the varicella-zoster virus. This is the same virus that causes chickenpox.  Shingles is a painful rash that develops in people who had chickenpox or have been exposed to the virus. How can I eat healthy? My Plate is a model for planning healthy meals. It shows the types and amounts of foods that should go on your plate. Fruits and vegetables make up about half of your plate, and grains and protein make up the other half. A serving of dairy is included on the side of your plate. The amount of calories and serving sizes you need depends on your age, gender, weight, and height. Examples of healthy foods are listed below:  Eat a variety of vegetables  such as dark green, red, and orange vegetables. You can also include canned vegetables low in sodium (salt) and frozen vegetables without added butter or sauces. Eat a variety of fresh fruits , canned fruit in 100% juice, frozen fruit, and dried fruit. Include whole grains. At least half of the grains you eat should be whole grains. Examples include whole-wheat bread, wheat pasta, brown rice, and whole-grain cereals such as oatmeal.    Eat a variety of protein foods such as seafood (fish and shellfish), lean meat, and poultry without skin (turkey and chicken). Examples of lean meats include pork leg, shoulder, or tenderloin, and beef round, sirloin, tenderloin, and extra lean ground beef. Other protein foods include eggs and egg substitutes, beans, peas, soy products, nuts, and seeds. Choose low-fat dairy products such as skim or 1% milk or low-fat yogurt, cheese, and cottage cheese. Limit unhealthy fats  such as butter, hard margarine, and shortening. How much exercise do I need? Exercise at least 30 minutes per day on most days of the week. Some examples of exercise include walking, biking, dancing, and swimming. You can also fit in more physical activity by taking the stairs instead of the elevator or parking farther away from stores. Include muscle strengthening activities 2 days each week. Regular exercise provides many health benefits.  It helps you manage your weight, and decreases your risk for type 2 diabetes, heart disease, stroke, and high blood pressure. Exercise can also help improve your mood. Ask your healthcare provider about the best exercise plan for you. What are some general health and safety guidelines I should follow? Do not smoke. Nicotine and other chemicals in cigarettes and cigars can cause lung damage. Ask your healthcare provider for information if you currently smoke and need help to quit. E-cigarettes or smokeless tobacco still contain nicotine. Talk to your healthcare provider before you use these products. Limit alcohol. A drink of alcohol is 12 ounces of beer, 5 ounces of wine, or 1½ ounces of liquor. Lose weight, if needed. Being overweight increases your risk of certain health conditions. These include heart disease, high blood pressure, type 2 diabetes, and certain types of cancer. Protect your skin. Do not sunbathe or use tanning beds. Use sunscreen with a SPF 15 or higher. Apply sunscreen at least 15 minutes before you go outside. Reapply sunscreen every 2 hours. Wear protective clothing, hats, and sunglasses when you are outside. Drive safely. Always wear your seatbelt. Make sure everyone in your car wears a seatbelt. A seatbelt can save your life if you are in an accident. Do not use your cell phone when you are driving. This could distract you and cause an accident. Pull over if you need to make a call or send a text message. Practice safe sex. Use latex condoms if are sexually active and have more than one partner. Your healthcare provider may recommend screening tests for sexually transmitted infections (STIs). Wear helmets, lifejackets, and protective gear. Always wear a helmet when you ride a bike or motorcycle, go skiing, or play sports that could cause a head injury. Wear protective equipment when you play sports. Wear a lifejacket when you are on a boat or doing water sports.     CARE AGREEMENT: You have the right to help plan your care. Learn about your health condition and how it may be treated. Discuss treatment options with your healthcare providers to decide what care you want to receive. You always have the right to refuse treatment. The above information is an  only. It is not intended as medical advice for individual conditions or treatments. Talk to your doctor, nurse or pharmacist before following any medical regimen to see if it is safe and effective for you. © Copyright [employer] 2023 Information is for End User's use only and may not be sold, redistributed or otherwise used for commercial purposes.
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
## 2023-10-18 ENCOUNTER — PATIENT MESSAGE (OUTPATIENT)
Dept: FAMILY MEDICINE CLINIC | Facility: CLINIC | Age: 38
End: 2023-10-18

## 2023-10-19 DIAGNOSIS — S02.609A MANDIBULAR FRACTURE (HCC): ICD-10-CM

## 2023-10-23 RX ORDER — IBUPROFEN 600 MG/1
600 TABLET ORAL EVERY 6 HOURS PRN
Qty: 30 TABLET | Refills: 1 | Status: SHIPPED | OUTPATIENT
Start: 2023-10-23

## 2024-01-26 DIAGNOSIS — S02.609A MANDIBULAR FRACTURE (HCC): ICD-10-CM

## 2024-01-26 RX ORDER — IBUPROFEN 600 MG/1
600 TABLET ORAL EVERY 6 HOURS PRN
Qty: 30 TABLET | Refills: 0 | Status: SHIPPED | OUTPATIENT
Start: 2024-01-26

## 2024-02-14 ENCOUNTER — CLINICAL SUPPORT (OUTPATIENT)
Dept: FAMILY MEDICINE CLINIC | Facility: CLINIC | Age: 39
End: 2024-02-14

## 2024-02-14 DIAGNOSIS — Z11.1 ENCOUNTER FOR TB TINE TEST: Primary | ICD-10-CM

## 2024-02-14 PROCEDURE — 86580 TB INTRADERMAL TEST: CPT

## 2024-02-16 ENCOUNTER — CLINICAL SUPPORT (OUTPATIENT)
Dept: FAMILY MEDICINE CLINIC | Facility: CLINIC | Age: 39
End: 2024-02-16

## 2024-02-16 DIAGNOSIS — Z11.1 ENCOUNTER FOR PPD SKIN TEST READING: Primary | ICD-10-CM

## 2024-02-16 LAB
INDURATION: 0 MM
TB SKIN TEST: NEGATIVE

## 2024-02-19 ENCOUNTER — PATIENT MESSAGE (OUTPATIENT)
Dept: FAMILY MEDICINE CLINIC | Facility: CLINIC | Age: 39
End: 2024-02-19

## 2024-02-19 ENCOUNTER — TELEPHONE (OUTPATIENT)
Dept: FAMILY MEDICINE CLINIC | Facility: CLINIC | Age: 39
End: 2024-02-19

## 2024-02-19 NOTE — TELEPHONE ENCOUNTER
Tb work form received on 2/19/24  to be completed by PCP. Copy made and placed in PCP folder.    Forms to be delivered to PCP mailbox at assigned time.

## 2024-02-22 NOTE — PATIENT COMMUNICATION
The forms process was started on 2/19 and forwarded to yordan collier. Did you fill out the form?

## 2024-02-28 ENCOUNTER — CLINICAL SUPPORT (OUTPATIENT)
Dept: FAMILY MEDICINE CLINIC | Facility: CLINIC | Age: 39
End: 2024-02-28

## 2024-02-28 DIAGNOSIS — Z11.1 ENCOUNTER FOR TB TINE TEST: Primary | ICD-10-CM

## 2024-02-28 PROCEDURE — 86580 TB INTRADERMAL TEST: CPT

## 2024-03-01 ENCOUNTER — CLINICAL SUPPORT (OUTPATIENT)
Dept: FAMILY MEDICINE CLINIC | Facility: CLINIC | Age: 39
End: 2024-03-01

## 2024-03-01 DIAGNOSIS — Z11.1 ENCOUNTER FOR PPD SKIN TEST READING: Primary | ICD-10-CM

## 2024-03-01 LAB
INDURATION: 0 MM
TB SKIN TEST: NEGATIVE

## 2024-03-12 ENCOUNTER — APPOINTMENT (EMERGENCY)
Dept: CT IMAGING | Facility: HOSPITAL | Age: 39
End: 2024-03-12

## 2024-03-12 ENCOUNTER — HOSPITAL ENCOUNTER (EMERGENCY)
Facility: HOSPITAL | Age: 39
Discharge: HOME | End: 2024-03-12
Attending: EMERGENCY MEDICINE

## 2024-03-12 ENCOUNTER — APPOINTMENT (EMERGENCY)
Dept: ULTRASOUND IMAGING | Facility: HOSPITAL | Age: 39
End: 2024-03-12

## 2024-03-12 VITALS
SYSTOLIC BLOOD PRESSURE: 136 MMHG | WEIGHT: 110 LBS | TEMPERATURE: 97 F | RESPIRATION RATE: 16 BRPM | HEART RATE: 100 BPM | OXYGEN SATURATION: 99 % | DIASTOLIC BLOOD PRESSURE: 97 MMHG | BODY MASS INDEX: 19.49 KG/M2

## 2024-03-12 DIAGNOSIS — M25.551 RIGHT HIP PAIN: ICD-10-CM

## 2024-03-12 DIAGNOSIS — R10.31 RLQ ABDOMINAL PAIN: ICD-10-CM
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
## 2024-03-12 DIAGNOSIS — N83.201 RIGHT OVARIAN CYST: ICD-10-CM

## 2024-03-12 LAB
ANION GAP SERPL CALCULATED.3IONS-SCNC: 7 MMOL/L
BUN SERPL-MCNC: 11 MG/DL (ref 5–25)
CALCIUM SERPL-MCNC: 9 MG/DL (ref 8.4–10.2)
CHLORIDE SERPL-SCNC: 101 MMOL/L (ref 96–108)
CO2 SERPL-SCNC: 28 MMOL/L (ref 21–32)
CREAT SERPL-MCNC: 0.69 MG/DL (ref 0.6–1.3)
EXT PREGNANCY TEST URINE: NEGATIVE
EXT. CONTROL: NORMAL
GLUCOSE SERPL-MCNC: 94 MG/DL (ref 65–140)
HCG SERPL QL: NEGATIVE
POTASSIUM SERPL-SCNC: 3.8 MMOL/L (ref 3.5–5.3)
SODIUM SERPL-SCNC: 136 MMOL/L (ref 135–147)

## 2024-03-12 PROCEDURE — 96361 HYDRATE IV INFUSION ADD-ON: CPT

## 2024-03-12 PROCEDURE — 80048 BASIC METABOLIC PNL TOTAL CA: CPT

## 2024-03-12 PROCEDURE — 96375 TX/PRO/DX INJ NEW DRUG ADDON: CPT

## 2024-03-12 PROCEDURE — 96374 THER/PROPH/DIAG INJ IV PUSH: CPT

## 2024-03-12 PROCEDURE — 84703 CHORIONIC GONADOTROPIN ASSAY: CPT

## 2024-03-12 PROCEDURE — 99285 EMERGENCY DEPT VISIT HI MDM: CPT

## 2024-03-12 PROCEDURE — 74177 CT ABD & PELVIS W/CONTRAST: CPT

## 2024-03-12 PROCEDURE — 36415 COLL VENOUS BLD VENIPUNCTURE: CPT

## 2024-03-12 PROCEDURE — 76830 TRANSVAGINAL US NON-OB: CPT

## 2024-03-12 PROCEDURE — 99284 EMERGENCY DEPT VISIT MOD MDM: CPT

## 2024-03-12 PROCEDURE — 81025 URINE PREGNANCY TEST: CPT

## 2024-03-12 PROCEDURE — 76856 US EXAM PELVIC COMPLETE: CPT

## 2024-03-12 RX ORDER — MORPHINE SULFATE 4 MG/ML
4 INJECTION, SOLUTION INTRAMUSCULAR; INTRAVENOUS ONCE
Status: COMPLETED | OUTPATIENT
Start: 2024-03-12 | End: 2024-03-12

## 2024-03-12 RX ORDER — KETOROLAC TROMETHAMINE 30 MG/ML
15 INJECTION, SOLUTION INTRAMUSCULAR; INTRAVENOUS ONCE
Status: COMPLETED | OUTPATIENT
Start: 2024-03-12 | End: 2024-03-12

## 2024-03-12 RX ORDER — METHOCARBAMOL 500 MG/1
500 TABLET, FILM COATED ORAL ONCE
Status: COMPLETED | OUTPATIENT
Start: 2024-03-12 | End: 2024-03-12

## 2024-03-12 RX ORDER — LIDOCAINE 50 MG/G
1 PATCH TOPICAL EVERY 24 HOURS
Qty: 15 PATCH | Refills: 0 | Status: SHIPPED | OUTPATIENT
Start: 2024-03-12 | End: 2024-03-28

## 2024-03-12 RX ORDER — METHOCARBAMOL 500 MG/1
500 TABLET, FILM COATED ORAL 2 TIMES DAILY
Qty: 20 TABLET | Refills: 0 | Status: SHIPPED | OUTPATIENT
Start: 2024-03-12 | End: 2024-03-28

## 2024-03-12 RX ORDER — IBUPROFEN 800 MG/1
800 TABLET ORAL 3 TIMES DAILY
Qty: 30 TABLET | Refills: 0 | Status: SHIPPED | OUTPATIENT
Start: 2024-03-12 | End: 2024-03-28

## 2024-03-12 RX ORDER — LIDOCAINE 50 MG/G
1 PATCH TOPICAL ONCE
Status: DISCONTINUED | OUTPATIENT
Start: 2024-03-12 | End: 2024-03-12 | Stop reason: HOSPADM

## 2024-03-12 RX ADMIN — LIDOCAINE 1 PATCH: 700 PATCH TOPICAL at 06:34

## 2024-03-12 RX ADMIN — IOHEXOL 100 ML: 350 INJECTION, SOLUTION INTRAVENOUS at 03:31

## 2024-03-12 RX ADMIN — METHOCARBAMOL TABLETS 500 MG: 500 TABLET, COATED ORAL at 06:34

## 2024-03-12 RX ADMIN — SODIUM CHLORIDE 1000 ML: 0.9 INJECTION, SOLUTION INTRAVENOUS at 02:10

## 2024-03-12 RX ADMIN — MORPHINE SULFATE 4 MG: 4 INJECTION, SOLUTION INTRAMUSCULAR; INTRAVENOUS at 02:04

## 2024-03-12 RX ADMIN — KETOROLAC TROMETHAMINE 15 MG: 30 INJECTION, SOLUTION INTRAMUSCULAR; INTRAVENOUS at 06:27

## 2024-03-12 NOTE — ED NOTES
1. Follow back up in 2 weeks for bp eval  2. Low sodium diet  3. Take bp med everyday, discussed at length risks of not taking meds  4. rx for Albuterol given since pt states that the inhaler was prn  5. Avoid allergens   Patient transported to Ultrasound     Keena Hirsch RN  03/12/24 [id number]
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
## 2024-03-23 ENCOUNTER — HOSPITAL ENCOUNTER (EMERGENCY)
Facility: HOSPITAL | Age: 39
End: 2024-03-25
Attending: EMERGENCY MEDICINE

## 2024-03-23 DIAGNOSIS — Z00.8 MEDICAL CLEARANCE FOR PSYCHIATRIC ADMISSION: ICD-10-CM

## 2024-03-23 DIAGNOSIS — F31.89 OTHER BIPOLAR DISORDER (HCC): ICD-10-CM

## 2024-03-23 DIAGNOSIS — F41.1 GENERALIZED ANXIETY DISORDER: ICD-10-CM

## 2024-03-23 DIAGNOSIS — R45.851 SUICIDAL IDEATIONS: Primary | ICD-10-CM

## 2024-03-23 PROCEDURE — 99284 EMERGENCY DEPT VISIT MOD MDM: CPT

## 2024-03-23 NOTE — LETTER
[facility] EMERGENCY DEPARTMENT  [address] [facility] [address] [phone]  Dept: [phone]      EMTALA TRANSFER CONSENT    NAME Keli Li                                         1985                              MRN [id number]    I have been informed of my rights regarding examination, treatment, and transfer   by Dr. Dennise burris. providers found    Benefits:  mental health treatment    Risks:  delay om care      { ED EMTALA TRANSFER CHOICES:[id number]}    I authorize the performance of emergency medical procedures and treatments upon me in both transit and upon arrival at the receiving facility.  Additionally, I authorize the release of any and all medical records to the receiving facility and request they be transported with me, if possible.  I understand that the safest mode of transportation during a medical emergency is an ambulance and that the Hospital advocates the use of this mode of transport. Risks of traveling to the receiving facility by car, including absence of medical control, life sustaining equipment, such as oxygen, and medical personnel has been explained to me and I fully understand them.    (MAYA CORRECT BOX BELOW)  [ x ]  I consent to the stated transfer and to be transported by ambulance/helicopter.  [  ]  I consent to the stated transfer, but refuse transportation by ambulance and accept full responsibility for my transportation by car.  I understand the risks of non-ambulance transfers and I exonerate the Hospital and its staff from any deterioration in my condition that results from this refusal.    X___________________________________________    DATE  24  TIME________  Signature of patient or legally responsible individual signing on patient behalf           RELATIONSHIP TO PATIENT___self ______________________          Provider Certification    NAME Keli ROBBI Jen GTZ 1985                               MRN [id number]    A medical screening exam was performed on the above named patient.  Based on the examination:    Condition Necessitating Transfer The primary encounter diagnosis was Suicidal ideations. Diagnoses of Other bipolar disorder (HCC), Generalized anxiety disorder, and Medical clearance for psychiatric admission were also pertinent to this visit.    Patient Condition:  depression    Reason for Transfer:  in patient mental health treatment    Transfer Requirements: Facility   [facility]   Space available and qualified personnel available for treatment as acknowledged by  Intake  Agreed to accept transfer and to provide appropriate medical treatment as acknowledged by        Karol Dasilva   Appropriate medical records of the examination and treatment of the patient are provided at the time of transfer   STAFF INITIAL WHEN COMPLETED ___ap____  Transfer will be performed by qualified personnel from    [facility] and appropriate transfer equipment as required, including the use of necessary and appropriate life support measures.    Provider Certification: I have examined the patient and explained the following risks and benefits of being transferred/refusing transfer to the patient/family:   Voluntary mental health admission      Based on these reasonable risks and benefits to the patient and/or the unborn child(rishabh), and based upon the information available at the time of the patient’s examination, I certify that the medical benefits reasonably to be expected from the provision of appropriate medical treatments at another medical facility outweigh the increasing risks, if any, to the individual’s medical condition, and in the case of labor to the unborn child, from effecting the transfer.    X____________________________________________ DATE 03/25/24        TIME_______      ORIGINAL - SEND TO MEDICAL RECORDS   COPY - SEND WITH PATIENT DURING TRANSFER

## 2024-03-24 LAB
AMPHETAMINES SERPL QL SCN: NEGATIVE
BARBITURATES UR QL: NEGATIVE
BENZODIAZ UR QL: NEGATIVE
COCAINE UR QL: POSITIVE
ETHANOL EXG-MCNC: 0.02 MG/DL
EXT PREGNANCY TEST URINE: NEGATIVE
EXT. CONTROL: NORMAL
METHADONE UR QL: NEGATIVE
OPIATES UR QL SCN: NEGATIVE
OXYCODONE+OXYMORPHONE UR QL SCN: NEGATIVE
PCP UR QL: NEGATIVE
THC UR QL: POSITIVE

## 2024-03-24 PROCEDURE — 99285 EMERGENCY DEPT VISIT HI MDM: CPT | Performed by: EMERGENCY MEDICINE

## 2024-03-24 PROCEDURE — 82075 ASSAY OF BREATH ETHANOL: CPT | Performed by: EMERGENCY MEDICINE

## 2024-03-24 PROCEDURE — 81025 URINE PREGNANCY TEST: CPT | Performed by: EMERGENCY MEDICINE

## 2024-03-24 PROCEDURE — 80307 DRUG TEST PRSMV CHEM ANLYZR: CPT | Performed by: EMERGENCY MEDICINE

## 2024-03-24 RX ORDER — FLUOXETINE 10 MG/1
10 CAPSULE ORAL DAILY
Status: DISCONTINUED | OUTPATIENT
Start: 2024-03-24 | End: 2024-03-25 | Stop reason: HOSPADM

## 2024-03-24 RX ORDER — IBUPROFEN 600 MG/1
600 TABLET ORAL ONCE
Status: COMPLETED | OUTPATIENT
Start: 2024-03-24 | End: 2024-03-24

## 2024-03-24 RX ORDER — OLANZAPINE 5 MG/1
2.5 TABLET ORAL ONCE
Status: COMPLETED | OUTPATIENT
Start: 2024-03-24 | End: 2024-03-24

## 2024-03-24 RX ORDER — OLANZAPINE 5 MG/1
2.5 TABLET ORAL
Status: DISCONTINUED | OUTPATIENT
Start: 2024-03-24 | End: 2024-03-25 | Stop reason: HOSPADM

## 2024-03-24 RX ADMIN — FLUOXETINE 10 MG: 10 CAPSULE ORAL at 08:48

## 2024-03-24 RX ADMIN — OLANZAPINE 2.5 MG: 5 TABLET, FILM COATED ORAL at 00:23

## 2024-03-24 RX ADMIN — OLANZAPINE 2.5 MG: 5 TABLET, FILM COATED ORAL at 22:44

## 2024-03-24 RX ADMIN — IBUPROFEN 600 MG: 600 TABLET ORAL at 14:21

## 2024-03-24 NOTE — ED CARE HANDOFF
Emergency Department Sign Out Note        Sign out and transfer of care from Dr. Ulloa. See Separate Emergency Department note.     The patient, Keli Li, was evaluated by the previous provider for MH.    Workup Completed:  201    ED Course / Workup Pending (followup):  Pending placement.                                     Procedures  Medical Decision Making  Amount and/or Complexity of Data Reviewed  Labs: ordered.    Risk  Prescription drug management.            Disposition  Final diagnoses:   Suicidal ideations   Other bipolar disorder (HCC)   Generalized anxiety disorder     Time reflects when diagnosis was documented in both MDM as applicable and the Disposition within this note       Time User Action Codes Description Comment    3/24/2024 12:30 AM Clemencia German [R45.851] Suicidal ideations     3/24/2024 12:30 AM Clemencia German [F31.89] Other bipolar disorder (HCC)     3/24/2024 12:30 AM Clemencia German [F41.1] Generalized anxiety disorder           ED Disposition       ED Disposition   Transfer to Another Facility-In Network    Condition   --    Date/Time   Sun Mar 24, 2024 12:30 AM    Comment   Keli Li should be transferred out to [facility].               MD Documentation      Flowsheet Row Most Recent Value   Sending MD cruz. Clemencia German MD          Follow-up Information    None       Patient's Medications   Discharge Prescriptions    No medications on file     No discharge procedures on file.       ED Provider  Electronically Signed by     Bentley Swift MD  03/24/24 4913
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
## 2024-03-24 NOTE — ED CARE HANDOFF
Emergency Department Sign Out Note        Sign out and transfer of care from Dr German. See Separate Emergency Department note.     The patient, Keli Li, was evaluated by the previous provider for si  bipolar depression.    Workup Completed:  As above    ED Course / Workup Pending (followup):  On 201 for depression  bipolar si .. If pt decides to leave need psych                                  ED Course as of 03/24/24 0651   Sun Mar 24, 2024   0649 Bipolar depression si ... 201     Procedures  Medical Decision Making  Amount and/or Complexity of Data Reviewed  Labs: ordered.    Risk  Prescription drug management.            Disposition  Final diagnoses:   Suicidal ideations   Other bipolar disorder (HCC)   Generalized anxiety disorder     Time reflects when diagnosis was documented in both MDM as applicable and the Disposition within this note       Time User Action Codes Description Comment    3/24/2024 12:30 AM Clemencia German [R45.851] Suicidal ideations     3/24/2024 12:30 AM Clemencia German [F31.89] Other bipolar disorder (HCC)     3/24/2024 12:30 AM Clemencia German [F41.1] Generalized anxiety disorder           ED Disposition       ED Disposition   Transfer to Another Facility-In Network    Condition   --    Date/Time   Sun Mar 24, 2024 12:30 AM    Comment   Keli Li should be transferred out to [facility].               MD Documentation      Flowsheet Row Most Recent Value   Sending MD sr. Clemencia German MD          Follow-up Information    None       Patient's Medications   Discharge Prescriptions    No medications on file     No discharge procedures on file.       ED Provider  Electronically Signed by

## 2024-03-25 ENCOUNTER — HOSPITAL ENCOUNTER (INPATIENT)
Facility: HOSPITAL | Age: 39
LOS: 3 days | Discharge: HOME | DRG: 751 | End: 2024-03-28
Attending: STUDENT IN AN ORGANIZED HEALTH CARE EDUCATION/TRAINING PROGRAM | Admitting: PSYCHIATRY & NEUROLOGY
Payer: COMMERCIAL

## 2024-03-25 VITALS
OXYGEN SATURATION: 100 % | TEMPERATURE: 98.3 F | WEIGHT: 109.57 LBS | HEART RATE: 60 BPM | RESPIRATION RATE: 16 BRPM | SYSTOLIC BLOOD PRESSURE: 127 MMHG | DIASTOLIC BLOOD PRESSURE: 74 MMHG | BODY MASS INDEX: 19.41 KG/M2

## 2024-03-25 DIAGNOSIS — F33.1 MODERATE EPISODE OF RECURRENT MAJOR DEPRESSIVE DISORDER (HCC): Primary | ICD-10-CM

## 2024-03-25 DIAGNOSIS — Z00.8 MEDICAL CLEARANCE FOR PSYCHIATRIC ADMISSION: ICD-10-CM

## 2024-03-25 PROCEDURE — 99245 OFF/OP CONSLTJ NEW/EST HI 55: CPT | Performed by: PSYCHIATRY & NEUROLOGY

## 2024-03-25 RX ORDER — FLUOXETINE 10 MG/1
10 CAPSULE ORAL DAILY
Status: DISCONTINUED | OUTPATIENT
Start: 2024-03-26 | End: 2024-03-26

## 2024-03-25 RX ORDER — OLANZAPINE 10 MG/1
10 TABLET ORAL
Status: DISCONTINUED | OUTPATIENT
Start: 2024-03-25 | End: 2024-03-28 | Stop reason: HOSPADM

## 2024-03-25 RX ORDER — OLANZAPINE 2.5 MG/1
2.5 TABLET, FILM COATED ORAL
Status: DISCONTINUED | OUTPATIENT
Start: 2024-03-25 | End: 2024-03-28 | Stop reason: HOSPADM

## 2024-03-25 RX ORDER — OLANZAPINE 5 MG/1
5 TABLET ORAL
Status: CANCELLED | OUTPATIENT
Start: 2024-03-25

## 2024-03-25 RX ORDER — TRAZODONE HYDROCHLORIDE 50 MG/1
50 TABLET ORAL
Status: CANCELLED | OUTPATIENT
Start: 2024-03-25

## 2024-03-25 RX ORDER — ACETAMINOPHEN 325 MG/1
650 TABLET ORAL EVERY 4 HOURS PRN
Status: CANCELLED | OUTPATIENT
Start: 2024-03-25

## 2024-03-25 RX ORDER — OLANZAPINE 10 MG/2ML
10 INJECTION, POWDER, FOR SOLUTION INTRAMUSCULAR
Status: CANCELLED | OUTPATIENT
Start: 2024-03-25

## 2024-03-25 RX ORDER — BENZTROPINE MESYLATE 1 MG/1
1 TABLET ORAL
Status: CANCELLED | OUTPATIENT
Start: 2024-03-25

## 2024-03-25 RX ORDER — AMOXICILLIN 250 MG
1 CAPSULE ORAL DAILY PRN
Status: CANCELLED | OUTPATIENT
Start: 2024-03-25

## 2024-03-25 RX ORDER — OLANZAPINE 10 MG/2ML
5 INJECTION, POWDER, FOR SOLUTION INTRAMUSCULAR
Status: CANCELLED | OUTPATIENT
Start: 2024-03-25

## 2024-03-25 RX ORDER — BENZTROPINE MESYLATE 1 MG/1
1 TABLET ORAL
Status: DISCONTINUED | OUTPATIENT
Start: 2024-03-25 | End: 2024-03-28 | Stop reason: HOSPADM

## 2024-03-25 RX ORDER — BENZTROPINE MESYLATE 1 MG/ML
1 INJECTION INTRAMUSCULAR; INTRAVENOUS
Status: CANCELLED | OUTPATIENT
Start: 2024-03-25

## 2024-03-25 RX ORDER — LORAZEPAM 2 MG/ML
2 INJECTION INTRAMUSCULAR EVERY 6 HOURS PRN
Status: CANCELLED | OUTPATIENT
Start: 2024-03-25

## 2024-03-25 RX ORDER — BISACODYL 10 MG
10 SUPPOSITORY, RECTAL RECTAL DAILY PRN
Status: CANCELLED | OUTPATIENT
Start: 2024-03-25

## 2024-03-25 RX ORDER — POLYETHYLENE GLYCOL 3350 17 G/17G
17 POWDER, FOR SOLUTION ORAL DAILY PRN
Status: DISCONTINUED | OUTPATIENT
Start: 2024-03-25 | End: 2024-03-26

## 2024-03-25 RX ORDER — FLUOXETINE 10 MG/1
10 CAPSULE ORAL DAILY
Status: CANCELLED | OUTPATIENT
Start: 2024-03-26

## 2024-03-25 RX ORDER — ACETAMINOPHEN 325 MG/1
650 TABLET ORAL EVERY 6 HOURS PRN
Status: CANCELLED | OUTPATIENT
Start: 2024-03-25

## 2024-03-25 RX ORDER — TRAZODONE HYDROCHLORIDE 50 MG/1
50 TABLET ORAL
Status: DISCONTINUED | OUTPATIENT
Start: 2024-03-25 | End: 2024-03-28 | Stop reason: HOSPADM

## 2024-03-25 RX ORDER — DIPHENHYDRAMINE HYDROCHLORIDE 50 MG/ML
50 INJECTION INTRAMUSCULAR; INTRAVENOUS EVERY 6 HOURS PRN
Status: CANCELLED | OUTPATIENT
Start: 2024-03-25

## 2024-03-25 RX ORDER — DIPHENHYDRAMINE HYDROCHLORIDE 50 MG/ML
50 INJECTION INTRAMUSCULAR; INTRAVENOUS EVERY 6 HOURS PRN
Status: DISCONTINUED | OUTPATIENT
Start: 2024-03-25 | End: 2024-03-28 | Stop reason: HOSPADM

## 2024-03-25 RX ORDER — OLANZAPINE 10 MG/2ML
10 INJECTION, POWDER, FOR SOLUTION INTRAMUSCULAR
Status: DISCONTINUED | OUTPATIENT
Start: 2024-03-25 | End: 2024-03-28 | Stop reason: HOSPADM

## 2024-03-25 RX ORDER — HYDROXYZINE HYDROCHLORIDE 25 MG/1
25 TABLET, FILM COATED ORAL
Status: DISCONTINUED | OUTPATIENT
Start: 2024-03-25 | End: 2024-03-28 | Stop reason: HOSPADM

## 2024-03-25 RX ORDER — ACETAMINOPHEN 325 MG/1
975 TABLET ORAL EVERY 6 HOURS PRN
Status: CANCELLED | OUTPATIENT
Start: 2024-03-25

## 2024-03-25 RX ORDER — HYDROXYZINE 50 MG/1
50 TABLET, FILM COATED ORAL
Status: DISCONTINUED | OUTPATIENT
Start: 2024-03-25 | End: 2024-03-28 | Stop reason: HOSPADM

## 2024-03-25 RX ORDER — OLANZAPINE 10 MG/1
10 TABLET ORAL
Status: CANCELLED | OUTPATIENT
Start: 2024-03-25

## 2024-03-25 RX ORDER — HYDROXYZINE 50 MG/1
50 TABLET, FILM COATED ORAL
Status: CANCELLED | OUTPATIENT
Start: 2024-03-25

## 2024-03-25 RX ORDER — ACETAMINOPHEN 325 MG/1
975 TABLET ORAL EVERY 6 HOURS PRN
Status: DISCONTINUED | OUTPATIENT
Start: 2024-03-25 | End: 2024-03-28 | Stop reason: HOSPADM

## 2024-03-25 RX ORDER — POLYETHYLENE GLYCOL 3350 17 G/17G
17 POWDER, FOR SOLUTION ORAL DAILY PRN
Status: CANCELLED | OUTPATIENT
Start: 2024-03-25

## 2024-03-25 RX ORDER — HYDROXYZINE 50 MG/1
100 TABLET, FILM COATED ORAL
Status: DISCONTINUED | OUTPATIENT
Start: 2024-03-25 | End: 2024-03-28 | Stop reason: HOSPADM

## 2024-03-25 RX ORDER — BISACODYL 10 MG
10 SUPPOSITORY, RECTAL RECTAL DAILY PRN
Status: DISCONTINUED | OUTPATIENT
Start: 2024-03-25 | End: 2024-03-26

## 2024-03-25 RX ORDER — AMOXICILLIN 250 MG
1 CAPSULE ORAL DAILY PRN
Status: DISCONTINUED | OUTPATIENT
Start: 2024-03-25 | End: 2024-03-28 | Stop reason: HOSPADM

## 2024-03-25 RX ORDER — LORAZEPAM 2 MG/ML
2 INJECTION INTRAMUSCULAR EVERY 6 HOURS PRN
Status: DISCONTINUED | OUTPATIENT
Start: 2024-03-25 | End: 2024-03-28 | Stop reason: HOSPADM

## 2024-03-25 RX ORDER — OLANZAPINE 5 MG/1
2.5 TABLET ORAL
Status: CANCELLED | OUTPATIENT
Start: 2024-03-25

## 2024-03-25 RX ORDER — ACETAMINOPHEN 325 MG/1
650 TABLET ORAL EVERY 4 HOURS PRN
Status: DISCONTINUED | OUTPATIENT
Start: 2024-03-25 | End: 2024-03-28 | Stop reason: HOSPADM

## 2024-03-25 RX ORDER — MAGNESIUM HYDROXIDE/ALUMINUM HYDROXICE/SIMETHICONE 120; 1200; 1200 MG/30ML; MG/30ML; MG/30ML
30 SUSPENSION ORAL EVERY 4 HOURS PRN
Status: DISCONTINUED | OUTPATIENT
Start: 2024-03-25 | End: 2024-03-28 | Stop reason: HOSPADM

## 2024-03-25 RX ORDER — MAGNESIUM HYDROXIDE/ALUMINUM HYDROXICE/SIMETHICONE 120; 1200; 1200 MG/30ML; MG/30ML; MG/30ML
30 SUSPENSION ORAL EVERY 4 HOURS PRN
Status: CANCELLED | OUTPATIENT
Start: 2024-03-25

## 2024-03-25 RX ORDER — PROPRANOLOL HYDROCHLORIDE 20 MG/1
10 TABLET ORAL EVERY 8 HOURS PRN
Status: CANCELLED | OUTPATIENT
Start: 2024-03-25

## 2024-03-25 RX ORDER — OLANZAPINE 5 MG/1
5 TABLET ORAL
Status: DISCONTINUED | OUTPATIENT
Start: 2024-03-25 | End: 2024-03-28 | Stop reason: HOSPADM

## 2024-03-25 RX ORDER — PROPRANOLOL HYDROCHLORIDE 10 MG/1
10 TABLET ORAL EVERY 8 HOURS PRN
Status: DISCONTINUED | OUTPATIENT
Start: 2024-03-25 | End: 2024-03-28 | Stop reason: HOSPADM

## 2024-03-25 RX ORDER — HYDROXYZINE 50 MG/1
100 TABLET, FILM COATED ORAL
Status: CANCELLED | OUTPATIENT
Start: 2024-03-25

## 2024-03-25 RX ORDER — LORAZEPAM 1 MG/1
1 TABLET ORAL ONCE
Status: COMPLETED | OUTPATIENT
Start: 2024-03-25 | End: 2024-03-25

## 2024-03-25 RX ORDER — OLANZAPINE 2.5 MG/1
2.5 TABLET, FILM COATED ORAL
Status: DISCONTINUED | OUTPATIENT
Start: 2024-03-25 | End: 2024-03-26

## 2024-03-25 RX ORDER — ACETAMINOPHEN 325 MG/1
650 TABLET ORAL EVERY 6 HOURS PRN
Status: DISCONTINUED | OUTPATIENT
Start: 2024-03-25 | End: 2024-03-28 | Stop reason: HOSPADM

## 2024-03-25 RX ORDER — BENZTROPINE MESYLATE 1 MG/ML
1 INJECTION INTRAMUSCULAR; INTRAVENOUS
Status: DISCONTINUED | OUTPATIENT
Start: 2024-03-25 | End: 2024-03-28 | Stop reason: HOSPADM

## 2024-03-25 RX ORDER — HYDROXYZINE HYDROCHLORIDE 25 MG/1
25 TABLET, FILM COATED ORAL
Status: CANCELLED | OUTPATIENT
Start: 2024-03-25

## 2024-03-25 RX ORDER — OLANZAPINE 10 MG/2ML
5 INJECTION, POWDER, FOR SOLUTION INTRAMUSCULAR
Status: DISCONTINUED | OUTPATIENT
Start: 2024-03-25 | End: 2024-03-28 | Stop reason: HOSPADM

## 2024-03-25 RX ADMIN — OLANZAPINE 2.5 MG: 2.5 TABLET, FILM COATED ORAL at 22:13

## 2024-03-25 RX ADMIN — LORAZEPAM 1 MG: 1 TABLET ORAL at 12:49

## 2024-03-25 RX ADMIN — FLUOXETINE 10 MG: 10 CAPSULE ORAL at 08:34

## 2024-03-25 NOTE — PLAN OF CARE
RN will meet with patient at least twice per day to assess   for questions and concerns. RN will teach about prescribed medication and diagnosis.Patient will be taught and encouraged to use healthy   coping skills.
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
## 2024-03-25 NOTE — NURSING NOTE
"Keli scored \"Moderate Risk\" on Lifetime C-SSRS. Dr. Marly Richards of [facility] notified via Tiger Text, no new orders obtained at this time. Keli is currently \"Low Risk,\" without active SI, endorses feeling of safety on the unit.   "
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
## 2024-03-26 PROBLEM — F14.10 COCAINE ABUSE (HCC): Status: ACTIVE | Noted: 2024-03-26

## 2024-03-26 PROBLEM — M25.551 PAIN OF RIGHT HIP: Status: ACTIVE | Noted: 2024-03-26

## 2024-03-26 PROBLEM — Z00.8 MEDICAL CLEARANCE FOR PSYCHIATRIC ADMISSION: Status: ACTIVE | Noted: 2024-03-26

## 2024-03-26 LAB
ALBUMIN SERPL BCP-MCNC: 3.8 G/DL (ref 3.5–5)
ALP SERPL-CCNC: 54 U/L (ref 34–104)
ALT SERPL W P-5'-P-CCNC: 12 U/L (ref 7–52)
ANION GAP SERPL CALCULATED.3IONS-SCNC: 5 MMOL/L (ref 4–13)
AST SERPL W P-5'-P-CCNC: 15 U/L (ref 13–39)
BASOPHILS # BLD AUTO: 0.07 THOUSANDS/ÂΜL (ref 0–0.1)
BASOPHILS NFR BLD AUTO: 1 % (ref 0–1)
BILIRUB SERPL-MCNC: 0.54 MG/DL (ref 0.2–1)
BUN SERPL-MCNC: 13 MG/DL (ref 5–25)
CALCIUM SERPL-MCNC: 8.8 MG/DL (ref 8.4–10.2)
CHLORIDE SERPL-SCNC: 104 MMOL/L (ref 96–108)
CHOLEST SERPL-MCNC: 131 MG/DL
CO2 SERPL-SCNC: 28 MMOL/L (ref 21–32)
CREAT SERPL-MCNC: 0.72 MG/DL (ref 0.6–1.3)
EOSINOPHIL # BLD AUTO: 0.06 THOUSAND/ÂΜL (ref 0–0.61)
EOSINOPHIL NFR BLD AUTO: 1 % (ref 0–6)
ERYTHROCYTE [DISTWIDTH] IN BLOOD BY AUTOMATED COUNT: 14.5 % (ref 11.6–15.1)
EST. AVERAGE GLUCOSE BLD GHB EST-MCNC: 111 MG/DL
FOLATE SERPL-MCNC: 10.8 NG/ML
GFR SERPL CREATININE-BSD FRML MDRD: 105 ML/MIN/1.73SQ M
GLUCOSE P FAST SERPL-MCNC: 78 MG/DL (ref 65–99)
GLUCOSE SERPL-MCNC: 78 MG/DL (ref 65–140)
HBA1C MFR BLD: 5.5 %
HCT VFR BLD AUTO: 38.4 % (ref 34.8–46.1)
HDLC SERPL-MCNC: 61 MG/DL
HGB BLD-MCNC: 11.9 G/DL (ref 11.5–15.4)
IMM GRANULOCYTES # BLD AUTO: 0.02 THOUSAND/UL (ref 0–0.2)
IMM GRANULOCYTES NFR BLD AUTO: 0 % (ref 0–2)
LDLC SERPL CALC-MCNC: 61 MG/DL (ref 0–100)
LYMPHOCYTES # BLD AUTO: 2.61 THOUSANDS/ÂΜL (ref 0.6–4.47)
LYMPHOCYTES NFR BLD AUTO: 30 % (ref 14–44)
MCH RBC QN AUTO: 26.7 PG (ref 26.8–34.3)
MCHC RBC AUTO-ENTMCNC: 31 G/DL (ref 31.4–37.4)
MCV RBC AUTO: 86 FL (ref 82–98)
MONOCYTES # BLD AUTO: 0.61 THOUSAND/ÂΜL (ref 0.17–1.22)
MONOCYTES NFR BLD AUTO: 7 % (ref 4–12)
NEUTROPHILS # BLD AUTO: 5.23 THOUSANDS/ÂΜL (ref 1.85–7.62)
NEUTS SEG NFR BLD AUTO: 61 % (ref 43–75)
NONHDLC SERPL-MCNC: 70 MG/DL
NRBC BLD AUTO-RTO: 0 /100 WBCS
PLATELET # BLD AUTO: 347 THOUSANDS/UL (ref 149–390)
PMV BLD AUTO: 10.7 FL (ref 8.9–12.7)
POTASSIUM SERPL-SCNC: 4.1 MMOL/L (ref 3.5–5.3)
PROT SERPL-MCNC: 6.4 G/DL (ref 6.4–8.4)
RBC # BLD AUTO: 4.45 MILLION/UL (ref 3.81–5.12)
SODIUM SERPL-SCNC: 137 MMOL/L (ref 135–147)
TRIGL SERPL-MCNC: 47 MG/DL
VIT B12 SERPL-MCNC: 169 PG/ML (ref 180–914)
WBC # BLD AUTO: 8.6 THOUSAND/UL (ref 4.31–10.16)

## 2024-03-26 PROCEDURE — 80061 LIPID PANEL: CPT | Performed by: PSYCHIATRY & NEUROLOGY

## 2024-03-26 PROCEDURE — 85025 COMPLETE CBC W/AUTO DIFF WBC: CPT | Performed by: PSYCHIATRY & NEUROLOGY

## 2024-03-26 PROCEDURE — 83036 HEMOGLOBIN GLYCOSYLATED A1C: CPT | Performed by: PSYCHIATRY & NEUROLOGY

## 2024-03-26 PROCEDURE — 82746 ASSAY OF FOLIC ACID SERUM: CPT | Performed by: PSYCHIATRY & NEUROLOGY

## 2024-03-26 PROCEDURE — 99254 IP/OBS CNSLTJ NEW/EST MOD 60: CPT | Performed by: PHYSICIAN ASSISTANT

## 2024-03-26 PROCEDURE — 80053 COMPREHEN METABOLIC PANEL: CPT | Performed by: PSYCHIATRY & NEUROLOGY

## 2024-03-26 PROCEDURE — 82607 VITAMIN B-12: CPT | Performed by: PSYCHIATRY & NEUROLOGY

## 2024-03-26 PROCEDURE — 99223 1ST HOSP IP/OBS HIGH 75: CPT | Performed by: PSYCHIATRY & NEUROLOGY

## 2024-03-26 RX ORDER — FLUOXETINE HYDROCHLORIDE 20 MG/1
20 CAPSULE ORAL DAILY
Status: DISCONTINUED | OUTPATIENT
Start: 2024-03-27 | End: 2024-03-28 | Stop reason: HOSPADM

## 2024-03-26 RX ORDER — OLANZAPINE 5 MG/1
5 TABLET ORAL
Status: DISCONTINUED | OUTPATIENT
Start: 2024-03-26 | End: 2024-03-28 | Stop reason: HOSPADM

## 2024-03-26 RX ORDER — BISACODYL 10 MG
10 SUPPOSITORY, RECTAL RECTAL DAILY PRN
Status: DISCONTINUED | OUTPATIENT
Start: 2024-03-26 | End: 2024-03-28 | Stop reason: HOSPADM

## 2024-03-26 RX ORDER — POLYETHYLENE GLYCOL 3350 17 G/17G
17 POWDER, FOR SOLUTION ORAL DAILY PRN
Status: DISCONTINUED | OUTPATIENT
Start: 2024-03-26 | End: 2024-03-28 | Stop reason: HOSPADM

## 2024-03-26 RX ORDER — LIDOCAINE 50 MG/G
1 PATCH TOPICAL DAILY
Status: DISCONTINUED | OUTPATIENT
Start: 2024-03-26 | End: 2024-03-28 | Stop reason: HOSPADM

## 2024-03-26 RX ADMIN — LIDOCAINE 5% 1 PATCH: 700 PATCH TOPICAL at 13:27

## 2024-03-26 RX ADMIN — HYDROXYZINE HYDROCHLORIDE 100 MG: 50 TABLET, FILM COATED ORAL at 15:48

## 2024-03-26 RX ADMIN — OLANZAPINE 5 MG: 5 TABLET, FILM COATED ORAL at 21:37

## 2024-03-26 RX ADMIN — FLUOXETINE 10 MG: 10 CAPSULE ORAL at 08:12

## 2024-03-26 NOTE — PROGRESS NOTES
Patient is a 201. Patient reported to the ED due to worsening depressive symptoms. Patient reported not taking bipolar medications for over 1 year.     Patient denied SI and HI, AH and VH. Patient reported increase alcohol use over the last few weeks.       UDS, PAWSS, AUDIT, and BAT reviewed.    Discharge Date: TBD        03/26/24 0750   Team Meeting   Meeting Type Daily Rounds   Team Members Present   Team Members Present Physician;Nurse;;Other (Discipline and Name)   Physician Team Member Dr. Marcos/ Dr. Maldonado/ MARTY Trimble/ PA Student   Nursing Team Member Tre/ Aakash   Care Management Team Member Franc/ Ashley/ Edwige/ Donald   Other (Discipline and Name) Group Facilitator, Marilu/ Pharm. Technician   Patient/Family Present   Patient Present No   Patient's Family Present No

## 2024-03-26 NOTE — NURSING NOTE
"Pt left group, expressed feeling triggered. Asking for PRN for anxiety. Received Atarax 100 mg po. Pt states missing daughter. Feels like \"a bad mom\". Educated the importance of mental health treatment. Pt states \"I know I am doing the right thing, it's just hard\". Pt playing with card on bed to reduce anxiety. Denies SI/HI or hallucination. Upon follow up, pt appears visibly calmer. Medication effective.  "

## 2024-03-26 NOTE — PROGRESS NOTES
Reviewed Diagnosis of: Moderate episode of recurrent major depressive disorder (HCC)  Active Problems:    Cannabis abuse    Generalized anxiety disorder      Reviewed Short Term Goals of: decrease in depressive symptoms, decrease in suicidal thoughts.      All parties in agreement..       03/26/24 0120   Team Meeting   Meeting Type Tx Team Meeting   Team Members Present   Team Members Present Physician;Nurse;   Physician Team Member Dr. Marcos   Nursing Team Member Aakash   Care Management Team Member Franc   Patient/Family Present   Patient Present No  (pt declined to participate)   Patient's Family Present No
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
## 2024-03-26 NOTE — PLAN OF CARE
Problem: DISCHARGE PLANNING  Goal: Discharge to home or other facility with appropriate resources  Description: INTERVENTIONS:  - Identify barriers to discharge w/patient and caregiver  - Arrange for needed discharge resources and transportation as appropriate  - Identify discharge learning needs (meds, wound care, etc.)  - Arrange for interpretive services to assist at discharge as needed  - Refer to Case Management Department for coordinating discharge planning if the patient needs post-hospital services based on physician/advanced practitioner order or complex needs related to functional status, cognitive ability, or social support system  Outcome: Progressing  - CM met with patient and went over RICHI's, Intake, and Treatment Plan.

## 2024-03-26 NOTE — PLAN OF CARE
Problem: Ineffective Coping  Goal: Cooperates with admission process  Description: Interventions:   - Complete admission process  Outcome: Progressing  Goal: Demonstrates healthy coping skills  Outcome: Progressing     Problem: Depression  Goal: Verbalize thoughts and feelings  Description: Interventions:  - Assess and re-assess patient's level of risk   - Engage patient in 1:1 interactions, daily, for a minimum of 15 minutes   - Encourage patient to express feelings, fears, frustrations, hopes   Outcome: Progressing  Goal: Refrain from self-neglect  Outcome: Progressing  Goal: Attend and participate in unit activities, including therapeutic, recreational, and educational groups  Description: Interventions:  - Provide therapeutic and educational activities daily, encourage attendance and participation, and document same in the medical record   Outcome: Progressing  Goal: Complete daily ADLs, including personal hygiene independently, as able  Description: Interventions:  - Observe, teach, and assist patient with ADLS  -  Monitor and promote a balance of rest/activity, with adequate nutrition and elimination   Outcome: Progressing     Problem: Anxiety  Goal: Anxiety is at manageable level  Description: Interventions:  - Assess and monitor patient's anxiety level.   - Monitor for signs and symptoms (heart palpitations, chest pain, shortness of breath, headaches, nausea, feeling jumpy, restlessness, irritable, apprehensive).   - Collaborate with interdisciplinary team and initiate plan and interventions as ordered.  - [place] patient to unit/surroundings  - Explain treatment plan  - Encourage participation in care  - Encourage verbalization of concerns/fears  - Identify coping mechanisms  - Assist in developing anxiety-reducing skills  - Administer/offer alternative therapies  - Limit or eliminate stimulants  Outcome: Progressing

## 2024-03-26 NOTE — CASE MANAGEMENT
"Intake    Social Determinants completed with patient.    Patient Name:    Keli Li  :1985   Admit Date/Time:   3/25/24 at 17:07 Discharge Date:  TBD   Treatment Plan:   Reviewed and signed    Confirmed Address:    [facility]: [address] [place] [address] [address] [facility] [address]    Trav    Resides in the home with:   mother   daughter    Will Return Home at Discharge:   address above    Confirmed Phone Number:   [phone]   Confirmed Email Address:   n/a    Marital Status:  Children: single  1 daughter    Family History:            Parents:                       Siblings: n/a       3 siblings    Commitment Status:    Status Changes:  201    n/a    Admitted from:   [facility]    Presenting C/O:    Patient reported not feeling well for a while and needing to get help after everything became to much to handle.    As Per ED Note:     Pt arrives wanting to sign herself in for BH for depression and worsening mental state. Pt says she has not been on bipolar medication in over a year and that she feels like she \"doesn't wanna be here anymore\". Pt denies any active SI/HI/AH/VH. Reports increased alcohol use the last few weeks and last did cocaine appr. 2 days ago.   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     open    Legal:     Probation/[place] [facility]: Possible unpaid ticket that could lead to a warrant    Access to Firearms:   no    Transportation:   bus    Strength:   motivated to get well  good " listener  cooperative    Goal:   to get stable and back on medication    Referrals Needed:     [facility]   Transport at Discharge:   hospital to provide transportation    IMM: n/a Xavier Text RICHI: n/a   Emergency Contact:    Aunt- Roslyn Schreiber    ROIs obtained:     [facility]    Insurance:    [place]- [place]    Audit:      7 PAWSS:  not listed BAT: .018 UDS: Positive    Substance Abuse: Freq. Amount Last Use Notes:   Heroin       Amp/Meth       Alcohol       Cocaine tired it but not consistently      Cannabis daily       Benzodiazepine       Barbiturates       Other       Tobacco sometimes  1,2 cigarettes

## 2024-03-26 NOTE — CMS CERTIFICATION NOTE
Recertification: Based upon physical, mental and social evaluations, I certify that inpatient psychiatric services continue to be medically necessary for this patient for a duration of 3 midnights for the treatment of  Moderate episode of recurrent major depressive disorder (HCC) Available alternative community resources still do not meet the patient's mental health care needs. I further attest that an established written individualized plan of care has been updated and is outlined in the patient's medical records.

## 2024-03-26 NOTE — PROGRESS NOTES
03/26/24 1100   Activity/Group Checklist   Group Admission/Discharge  (Self-Assessment)   Attendance Attended   Attendance Duration (min) 16-30   Interactions Interacted appropriately   Affect/Mood Appropriate;Calm   Goals Achieved Identified feelings;Identified triggers;Discussed coping strategies;Discussed self-esteem issues;Identified distorted thoughts/beliefs;Identified resources and support systems;Able to reflect/comment on own behavior;Able to self-disclose;Able to recieve feedback;Able to give feedback to another

## 2024-03-26 NOTE — DISCHARGE INSTR - OTHER ORDERS
[facility] is a confidential 7 days/week telephone support service manned by trained mental health consumers.??Warmline operates daily but?is not able to accept?calls between?2AM-6AM. ? [facility] provides support, a listening ear and can provide information about available services. [facility] specializes in the concerns of mental health consumers, their families and friends.? However, we are also here for anyone who has a mental health concern, is confused about or just doesn't know anything about mental health or where to get information. To reach [facility], call [phone] accepts calls between 6:00 AM to 10:00 AM and from 4:00 PM to 12:00 AM.    Text CONNECT to 990036 from anywhere in the [place], anytime, about any type of crisis.  A live, trained Crisis Counselor receives the text and lets you know that they are here to listen.  The volunteer Crisis Counselor will help you move from a hot moment to a cool moment.   [facility] Crisis Intervention - licensed telephone and mobile crisis services that provide mental health assessments to all age groups regardless of income or insurance.? Crisis Intervention operates 24-hour/7 days a week. [facility] Crisis [facility]vention assists consumer who are experiencing a mental health emergency and lack the resources to assist themselves.? Immediate intervention for suicidal and depressed individuals with home visits/outreach being top priority. Crisis can be contacted at [phone].    The National [place] on Mental Illness (JOHANNA) offers various education & support groups for you & your family.  For more information visit their website at    http://www.johanna-lv.org/.   Dial 2-1-1 to get connected/get help.  Free, confidential information & referral available 24/7: Aging Services, Child & Youth Services, Counseling, Education/Training, Food/Shelter/Clothing, Health Services, Parenting, Substance Abuse, Support Groups, Volunteer Opportunities, & much more.   Phone: 2-1-1 or  [phone], Web: www.yb253ffph.org, Email: 211@[facility]   The [facility] ([address] [place] [address]) offers persons with a mental illness a safe, healing environment to explore their personal and vocational potential and receive support in achieving their goals. Instead of traditional therapy, the work of the house is the rehabilitation. As members contribute meaningful work to the house, they build confidence and a sense of purpose.  Be a Member - It’s Free   Membership in the [facility] is open to any [facility] resident who is 18 or older and has a history of mental illness. Membership is free for life.   [address] [address]   Hours: Monday - Friday: 8 a.m. - 4 p.m.    With varying hours on holidays    [phone]      Drop-In Center   The Drop[facility]In [facility] ([address] [place] [address]) provides a stress-free atmosphere for persons 18 and older who have experienced mental health issues.   What The Drop-In Center Offers   Activities   Games   Outings   Holiday gatherings   Recovery   Employment   Education   Community Resources   Empowerment   Helps participants achieve their goals   Enhances quality of life   Encourages leadership      The Drop-In Center    [address] [place] [address]   Hours: Monday through Friday: 4 p.m. - 9 p.m.   Saturday: 2 p.m. - 8 p.m.   Closed Sundays   Varying hours on holidays             Contact [phone]      Support & Referral Helpline   Support and Referral Helpline is a 24-hour, 7 days-a-week, listening and referral service provided to [facility].   Please call [phone] or tty [phone] to speak with one of our specialists.   The helpline is possible through partnerships with the [place] Department of Human Services and Center for Community Resources.         The 988 Suicide & Crisis  Lifeline (formerly known as the National Suicide Prevention Lifeline) provides free and confidential emotional support to people in suicidal crisis or emotional distress 24 hours a day, 7 days a week, across the [place]. The Lifeline is comprised of a national network of over 200 local crisis centers, combining custom local care and resources with national standards and best practices.

## 2024-03-26 NOTE — NURSING NOTE
"Pt compliant with morning medication. Pt nurses station asking to sign 72 hour. This writer explained and educated pt. 72 hour initiated 03/26/24. Pt expressed not being in a rush, although wanting to sign paper \"just in case\". Pt denies SI/HI or hallucination. Pt calm and cooperative during interaction. Utilizing reading as a coping skill.  "

## 2024-03-26 NOTE — TREATMENT PLAN
TREATMENT PLAN REVIEW - Behavioral Health Keli ROBBI Li 38 y.o. 1985 female MRN: [id number]    [facility] Room / Bed: [facility] 210/[facility] 210-02 Encounter: [id number]          Admit Date/Time:  3/25/2024  5:07 PM    Treatment Team:   DO Prerna Hernandez, REDDY Means, REDDY Gillette, REDDY Lujan, REDDY Martinez, REDDY Wilhelm, PADAYANA Lauren, REDDY Borjas    Diagnosis: Principal Problem:    Moderate episode of recurrent major depressive disorder (HCC)  Active Problems:    Cannabis abuse    Generalized anxiety disorder      Patient Strengths/Assets: ability for insight    Patient Barriers/Limitations: poor past treatment response    Short Term Goals: decrease in depressive symptoms, decrease in suicidal thoughts    Long Term Goals: improvement in depression, free of suicidal thoughts    Progress Towards Goals: starting psychiatric medications as prescribed, improving gradually    Recommended Treatment: medication management, patient medication education, group therapy, milieu therapy, continued Behavioral Health psychiatric evaluation/assessment process    Treatment Frequency: daily medication monitoring, group and milieu therapy daily, monitoring through interdisciplinary rounds, monitoring through weekly patient care conferences    Expected Discharge Date:  3 days    Discharge Plan: discharge to home    Treatment Plan Created/Updated By: Travis Marcos DO

## 2024-03-26 NOTE — PROGRESS NOTES
Discussed with patient: AUDIT score of 7  UDS/Identified Substance(s) used: Positive (THC & Cocaine)    Risks discussed included: CM discussed risks of drug and alcohol with patient. Patient is aware and is hopeful to use proper medication to treat her depressive symptoms moving forward.     Recommendations discussed: Patient and CM discussed drug and alcohol programs.  Patient's response: Patient stated that she will look into AA meeting, as she feels that alcohol has been more a concern lately.

## 2024-03-26 NOTE — DISCHARGE INSTR - APPOINTMENTS
You are being discharged to your confirmed address of [address] [place] [address], [place] [address], [facility] [address].    You will be contacted at your confirmed phone number of [phone].    Katharine, or Meron, our Behavioral Health Nurse Navigators, will be calling you after your discharge, on the phone number that you provided.  They will be available as an additional support, if needed.   If you wish to speak with one of them, you may contact Katharine at [phone] or Meron at [phone].
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
## 2024-03-26 NOTE — ASSESSMENT & PLAN NOTE
Admission labs reviewed, CBC, CMP, lipid panel, acceptable  Vitamin B12, folate, hemoglobin A1c labs pending  Vitals stable   UDS positive for THC and cocaine  COVID-19 negative  EKG reveals NSR, 66bpm   Medically stable for continued inpatient psychiatric treatment based on available results  Please contact SLIM with any questions or concerns

## 2024-03-27 PROCEDURE — 99232 SBSQ HOSP IP/OBS MODERATE 35: CPT | Performed by: PSYCHIATRY & NEUROLOGY

## 2024-03-27 RX ORDER — FLUOXETINE HYDROCHLORIDE 20 MG/1
20 CAPSULE ORAL DAILY
Qty: 30 CAPSULE | Refills: 0 | Status: SHIPPED | OUTPATIENT
Start: 2024-03-28 | End: 2024-04-27

## 2024-03-27 RX ORDER — OLANZAPINE 5 MG/1
5 TABLET ORAL
Qty: 30 TABLET | Refills: 0 | Status: SHIPPED | OUTPATIENT
Start: 2024-03-27 | End: 2024-04-26

## 2024-03-27 RX ADMIN — HYDROXYZINE HYDROCHLORIDE 100 MG: 50 TABLET, FILM COATED ORAL at 18:49

## 2024-03-27 RX ADMIN — LIDOCAINE 5% 1 PATCH: 700 PATCH TOPICAL at 08:07

## 2024-03-27 RX ADMIN — OLANZAPINE 5 MG: 5 TABLET, FILM COATED ORAL at 21:15

## 2024-03-27 RX ADMIN — FLUOXETINE 20 MG: 20 CAPSULE ORAL at 08:07

## 2024-03-27 NOTE — PROGRESS NOTES
03/27/24 0750   Team Meeting   Meeting Type Daily Rounds   Team Members Present   Team Members Present Nurse;Physician;;Other (Discipline and Name)   Physician Team Member Dr. Maldonado / Dr. Marcos / MARTY Trimble / PA Student   Nursing Team Member Aakash / Tre   Care Management Team Member Donald / Ashley / Edwige   Other (Discipline and Name) Sigmund (Group Facilitator)   Patient/Family Present   Patient Present No   Patient's Family Present No       Status: Pt is cooperative. Pt misses her daughter and is ready for discharge. Pt is denying SI, HI, and AVH. Pt slept through the night.     Medication: Pt received PRN Atarax for anxiety.     D/C: Pt is scheduled to discharge tomorrow 3/28/2024.

## 2024-03-27 NOTE — NURSING NOTE
Pt denies SI, AVH at this time in [facility] ,with peers watching TV will come to staff if feeling unsafe

## 2024-03-27 NOTE — PLAN OF CARE
Problem: DISCHARGE PLANNING  Goal: Discharge to home or other facility with appropriate resources  Description: INTERVENTIONS:  - Identify barriers to discharge w/patient and caregiver  - Arrange for needed discharge resources and transportation as appropriate  - Identify discharge learning needs (meds, wound care, etc.)  - Arrange for interpretive services to assist at discharge as needed  - Refer to Case Management Department for coordinating discharge planning if the patient needs post-hospital services based on physician/advanced practitioner order or complex needs related to functional status, cognitive ability, or social support system  Outcome: Progressing     Problem: Ineffective Coping  Goal: Cooperates with admission process  Description: Interventions:   - Complete admission process  Outcome: Progressing

## 2024-03-27 NOTE — NURSING NOTE
"Pt reports feeling improved. States \"the medication you gave me yesterday helped. I was getting really antsy\". Pt misses daughter. Expressed needing OP services set up. States \"I need to continue taking my medication and get help for myself and daughter\". Pt hopeful. Denies SI/HI or hallucination. Pt reading a book in bed. Denies any question or concern at this time.  "

## 2024-03-27 NOTE — PROGRESS NOTES
Progress Note - Behavioral Health   Keli Li 38 y.o. female MRN: [id number]  Unit/Bed#: [facility] 210-02 Encounter: [id number]    Assessment:  Principal Problem:    Moderate episode of recurrent major depressive disorder (HCC)  Active Problems:    Cannabis abuse    Generalized anxiety disorder    Medical clearance for psychiatric admission    Pain of right hip    Cocaine abuse (HCC)      Plan:  --Discharge tomorrow as per 72-hour notice.  No 302 criteria present.  --Continue with psychiatric hospitalization  --Continue with individual, group, and milieu therapy  --Continue the following medications:  Current Facility-Administered Medications   Medication Dose Route Frequency    acetaminophen (TYLENOL) tablet 650 mg  650 mg Oral Q6H PRN    acetaminophen (TYLENOL) tablet 650 mg  650 mg Oral Q4H PRN    acetaminophen (TYLENOL) tablet 975 mg  975 mg Oral Q6H PRN    aluminum-magnesium hydroxide-simethicone (MAALOX) oral suspension 30 mL  30 mL Oral Q4H PRN    Artificial Tears ophthalmic solution 1 drop  1 drop Both Eyes Q3H PRN    benztropine (COGENTIN) injection 1 mg  1 mg Intramuscular Q4H PRN Max 6/day    benztropine (COGENTIN) tablet 1 mg  1 mg Oral Q4H PRN Max 6/day    bisacodyl (DULCOLAX) rectal suppository 10 mg  10 mg Rectal Daily PRN    hydrOXYzine HCL (ATARAX) tablet 50 mg  50 mg Oral Q6H PRN Max 4/day    Or    diphenhydrAMINE (BENADRYL) injection 50 mg  50 mg Intramuscular Q6H PRN    FLUoxetine (PROzac) capsule 20 mg  20 mg Oral Daily    hydrOXYzine HCL (ATARAX) tablet 100 mg  100 mg Oral Q6H PRN Max 4/day    Or    LORazepam (ATIVAN) injection 2 mg  2 mg Intramuscular Q6H PRN    hydrOXYzine HCL (ATARAX) tablet 25 mg  25 mg Oral Q6H PRN Max 4/day    lidocaine (LIDODERM) 5 % patch 1 patch  1 patch Topical Daily    nicotine polacrilex (NICORETTE) gum 2 mg  2 mg Oral Q2H PRN    OLANZapine (ZyPREXA) tablet 10 mg  10 mg Oral Q3H PRN Max 3/day    Or    OLANZapine (ZyPREXA) IM injection 10 mg  10 mg Intramuscular Q3H PRN  Max 3/day    OLANZapine (ZyPREXA) tablet 5 mg  5 mg Oral Q3H PRN Max 6/day    Or    OLANZapine (ZyPREXA) IM injection 5 mg  5 mg Intramuscular Q3H PRN Max 6/day    OLANZapine (ZyPREXA) tablet 2.5 mg  2.5 mg Oral Q3H PRN Max 8/day    OLANZapine (ZyPREXA) tablet 5 mg  5 mg Oral HS    polyethylene glycol (MIRALAX) packet 17 g  17 g Oral Daily PRN    propranolol (INDERAL) tablet 10 mg  10 mg Oral Q8H PRN    senna-docusate sodium (SENOKOT S) 8.6-50 mg per tablet 1 tablet  1 tablet Oral Daily PRN    traZODone (DESYREL) tablet 50 mg  50 mg Oral HS PRN       Subjective: Patient was seen for continuation of care. Chart was reviewed and discussed with treatment team.     No acute behavioral events over the past 24 hours. Today, patient was seen and examined at bedside for continuation of care.     Today, the patient is feeling better.  She denies depression or anxiety symptoms.  She denies suicidal ideation.  She is tolerating increased doses of both Prozac and Zyprexa without any side effects.  She received Atarax 100 mg p.o. yesterday in the late afternoon for anxiety with positive effect.  She continues to demonstrate future oriented thinking and is looking forward to discharge tomorrow as per 72-hour notice.  There remains no criteria through which she can be held for further involuntary hospitalization.    Patient denied adverse effects to their psychiatric medication regimen. Patient denied other new or worsening psychiatric symptoms/complaints at this time. Discussed the importance of continuing to take medications as prescribed, as well as the importance of continuing to attend groups on the unit.     Psychiatric Review of Systems:  Medication adverse effects: none  Sleep: unchanged  Appetite: unchanged  Behavior over the past 24 hours: as per above    Vitals:  Vitals:    03/27/24 0724   BP: 143/86   Pulse: 62   Resp: 18   Temp: 98.1 °F (36.7 °C)   SpO2: 100%       Laboratory results:    I have personally reviewed all  pertinent laboratory/tests results  Recent Results (from the past 48 hour(s))   Comprehensive metabolic panel    Collection Time: 03/26/24  5:43 AM   Result Value Ref Range    Sodium 137 135 - 147 mmol/L    Potassium 4.1 3.5 - 5.3 mmol/L    Chloride 104 96 - 108 mmol/L    CO2 28 21 - 32 mmol/L    ANION GAP 5 4 - 13 mmol/L    BUN 13 5 - 25 mg/dL    Creatinine 0.72 0.60 - 1.30 mg/dL    Glucose 78 65 - 140 mg/dL    Glucose, Fasting 78 65 - 99 mg/dL    Calcium 8.8 8.4 - 10.2 mg/dL    AST 15 13 - 39 U/L    ALT 12 7 - 52 U/L    Alkaline Phosphatase 54 34 - 104 U/L    Total Protein 6.4 6.4 - 8.4 g/dL    Albumin 3.8 3.5 - 5.0 g/dL    Total Bilirubin 0.54 0.20 - 1.00 mg/dL    eGFR 105 ml/min/1.73sq m   Vitamin B12    Collection Time: 03/26/24  5:43 AM   Result Value Ref Range    Vitamin B-12 169 (L) 180 - 914 pg/mL   Folate    Collection Time: 03/26/24  5:43 AM   Result Value Ref Range    Folate 10.8 >5.9 ng/mL   Lipid panel    Collection Time: 03/26/24  5:43 AM   Result Value Ref Range    Cholesterol 131 See Comment mg/dL    Triglycerides 47 See Comment mg/dL    HDL, Direct 61 >=50 mg/dL    LDL Calculated 61 0 - 100 mg/dL    Non-HDL-Chol (CHOL-HDL) 70 mg/dl   CBC and differential    Collection Time: 03/26/24  5:44 AM   Result Value Ref Range    WBC 8.60 4.31 - 10.16 Thousand/uL    RBC 4.45 3.81 - 5.12 Million/uL    Hemoglobin 11.9 11.5 - 15.4 g/dL    Hematocrit 38.4 34.8 - 46.1 %    MCV 86 82 - 98 fL    MCH 26.7 (L) 26.8 - 34.3 pg    MCHC 31.0 (L) 31.4 - 37.4 g/dL    RDW 14.5 11.6 - 15.1 %    MPV 10.7 8.9 - 12.7 fL    Platelets 347 149 - 390 Thousands/uL    nRBC 0 /100 WBCs    Neutrophils Relative 61 43 - 75 %    Immature Grans % 0 0 - 2 %    Lymphocytes Relative 30 14 - 44 %    Monocytes Relative 7 4 - 12 %    Eosinophils Relative 1 0 - 6 %    Basophils Relative 1 0 - 1 %    Neutrophils Absolute 5.23 1.85 - 7.62 Thousands/µL    Absolute Immature Grans 0.02 0.00 - 0.20 Thousand/uL    Absolute Lymphocytes 2.61 0.60 - 4.47  "Thousands/µL    Absolute Monocytes 0.61 0.17 - 1.22 Thousand/µL    Eosinophils Absolute 0.06 0.00 - 0.61 Thousand/µL    Basophils Absolute 0.07 0.00 - 0.10 Thousands/µL   Hemoglobin A1C    Collection Time: 03/26/24  5:44 AM   Result Value Ref Range    Hemoglobin A1C 5.5 Normal 4.0-5.6%; PreDiabetic 5.7-6.4%; Diabetic >=6.5%; Glycemic control for adults with diabetes <7.0% %     mg/dl        Current Medications:  Current medications as per above. All medications have been reviewed.   Risks, benefits, alternatives, and possible side effects of patient's psychiatric medications were discussed with patient.     Mental Status Evaluation:  Appearance: casually dressed, appears consistent with stated age  Motor: no psychomotor disturbances, no gait abnormalities  Behavior: cooperative, interacts with this writer appropriately  Speech: normal rate, rhythm, and volume  Mood: \"better\"  Affect: euthymic, normal range and intensity  Thought Process: organized, linear, and goal-oriented  Thought Content: denies auditory hallucinations, denies visual hallucinations, denies delusions  Risk Potential: denies suicidal ideation, plan, or intent. Denies homicidal ideation  Sensorium: Oriented to person, place, time, and situation  Cognition: cognitive ability appears intact but was not quantitatively tested  Consciousness: alert and awake  Attention: able to focus without difficulty  Insight: improved  Judgement: improved      Progress Toward Goals & Illness Status: Patient is not at goal. They are not yet ready for discharge. The patient's condition currently requires active psychopharmacological medication management, interdisciplinary coordination with case management, and the utilization of adjunctive milieu and group therapy to augment psychopharmacological efficacy. The patient's risk of morbidity, and progression or decompensation of psychiatric disease, is higher without this current treatment.       This note has been " constructed using a voice recognition system. There may be translation, syntax, or grammatical errors. If you have any questions, please contact the dictating provider.

## 2024-03-27 NOTE — PLAN OF CARE
Problem: DISCHARGE PLANNING  Goal: Discharge to home or other facility with appropriate resources  Description: INTERVENTIONS:  - Identify barriers to discharge w/patient and caregiver  - Arrange for needed discharge resources and transportation as appropriate  - Identify discharge learning needs (meds, wound care, etc.)  - Arrange for interpretive services to assist at discharge as needed  - Refer to Case Management Department for coordinating discharge planning if the patient needs post-hospital services based on physician/advanced practitioner order or complex needs related to functional status, cognitive ability, or social support system  Outcome: Adequate for Discharge  Note: Pt is scheduled to discharge on 3/28/2024 to return to her residence. Pt is being discharged with a follow up with [facility].

## 2024-03-27 NOTE — CASE MANAGEMENT
CM met with Pt to discuss discharge planning. CM stated that they have the Pt scheduled to discharge tomorrow. CM asked if they are interested in [facility]. Pt stated that she is as long as it is in [place]. CM stated that they can refer her for [facility]. Pt signed an RICHI for [facility]. CM stated that they will call and left her know when she is scheduled. CM asked if she has a ride for tomorrow or if she needs a ride. Pt stated that she will need a ride. CM stated that they can schedule her a Lyft. Pt stated that is ok with her.     CM called [facility] @[phone] to scheduled Pt with an intake. CM left a voicemail.     CM met with Pt to discuss inactive insurance. CM stated that they noticed that the Pt's insurance is no longer active. CM stated that they can refer her for [facility] funding and she can still get paired with [facility]. CM stated that they will need to call the [facility] to get her set up. Pt asked how has she made it this far in the hospital without insurance and what is she going to do about her medication. CM stated that they can connect her with [facility] who will assist with her having her insurance back dated to cover the hospital stay. CM stated that they can provide her medication for her and a Goodrx card for future. Pt verbalized understanding. CM and Pt called [facility] and spoke with Sherrell. Sherrell obtained information from Pt. Pt selected [facility] as a provider for [facility]. Sherrell stated that they will contact the CM to schedule. CM ended the call. CM stated that they will keep the Pt informed about the appointment time. CM stated that they will schedule her Lyft for tomorrow.     CM received a call from [facility] through [facility]. She stated that she needs the Pt to sign consents and then they can get her scheduled. CM provided fax number to obtain the consents. CM stated that they will have them faxed back today.     CM met with Pt to  have her review and sign the consents for [facility]. RUDY stated that they will fax them back over and let her know when the appointment is scheduled.     RUDY faxed consents to [facility] through [facility] at fax number [phone].    RUDY received a call from [facility] through [employer]. She scheduled Pt for 4/1/2024 @3:15pm to meet with Shameka.

## 2024-03-28 VITALS
OXYGEN SATURATION: 99 % | TEMPERATURE: 97.9 F | HEART RATE: 78 BPM | HEIGHT: 63 IN | SYSTOLIC BLOOD PRESSURE: 137 MMHG | WEIGHT: 107 LBS | RESPIRATION RATE: 18 BRPM | DIASTOLIC BLOOD PRESSURE: 101 MMHG | BODY MASS INDEX: 18.96 KG/M2

## 2024-03-28 PROCEDURE — 99239 HOSP IP/OBS DSCHRG MGMT >30: CPT | Performed by: PSYCHIATRY & NEUROLOGY

## 2024-03-28 RX ADMIN — LIDOCAINE 5% 1 PATCH: 700 PATCH TOPICAL at 08:20

## 2024-03-28 RX ADMIN — HYDROXYZINE HYDROCHLORIDE 100 MG: 50 TABLET, FILM COATED ORAL at 08:10

## 2024-03-28 RX ADMIN — FLUOXETINE 20 MG: 20 CAPSULE ORAL at 08:10

## 2024-03-28 NOTE — NURSING NOTE
Pt pleasant and cooperative during interaction. Pt doing word searches to pass the time. Pt feels ready for discharge. Denies SI/HI or hallucination. Misses daughter. 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 Wants a return to work note since fairly new at work and worried about going down in levels d/t absence.  Pt reports feeling improved.

## 2024-03-28 NOTE — CASE MANAGEMENT
CM emailed [employer] to schedule the Pt on the [facility].     CM met with Pt to discuss discharge planning. CM informed her of the intake scheduled with [facility]. CM stated that they reached out to [facility] to see if she can get on their van. CM stated that they will let her know if they can take her or not. CM stated that they will schedule her a Lyft if not. Pt stated that she will need a return to work letter. CM stated they can have the doctor sign one. Pt stated that she would like to return tomorrow 3/29/2024. CM stated that they will put the letter in her discharge folder. CM stated that her medication will be given to her at discharge as well. CM suggested she follow up with the [facility] assistance office to get her insurance back on. Pt stated that she has a [employer] Account so she can go online and complete it. CM stated that their information is on her AVS if she has any questions or concerns. Pt verbalized understanding and stated she is ready for discharge.     CM received confirmation that the [facility] can get her @10am.     CM informed Pt that the [facility] will get her @10am today.

## 2024-03-28 NOTE — NURSING NOTE
Pt received prn Atarax 100mg at 18:49 for severe anxiety, Keen score = 25. On reassessment pt appears visibly calmer, reports feeling better. Denies any needs at this time.

## 2024-03-28 NOTE — PLAN OF CARE
Problem: DISCHARGE PLANNING  Goal: Discharge to home or other facility with appropriate resources  Description: INTERVENTIONS:  - Identify barriers to discharge w/patient and caregiver  - Arrange for needed discharge resources and transportation as appropriate  - Identify discharge learning needs (meds, wound care, etc.)  - Arrange for interpretive services to assist at discharge as needed  - Refer to Case Management Department for coordinating discharge planning if the patient needs post-hospital services based on physician/advanced practitioner order or complex needs related to functional status, cognitive ability, or social support system  Outcome: Adequate for Discharge     Problem: Ineffective Coping  Goal: Cooperates with admission process  Description: Interventions:   - Complete admission process  Outcome: Adequate for Discharge  Goal: Demonstrates healthy coping skills  Outcome: Adequate for Discharge  Goal: Participates in unit activities  Description: Interventions:  - Provide therapeutic environment   - Provide required programming   - Redirect inappropriate behaviors   Outcome: Adequate for Discharge     Problem: Depression  Goal: Verbalize thoughts and feelings  Description: Interventions:  - Assess and re-assess patient's level of risk   - Engage patient in 1:1 interactions, daily, for a minimum of 15 minutes   - Encourage patient to express feelings, fears, frustrations, hopes   Outcome: Adequate for Discharge  Goal: Refrain from self-neglect  Outcome: Adequate for Discharge  Goal: Attend and participate in unit activities, including therapeutic, recreational, and educational groups  Description: Interventions:  - Provide therapeutic and educational activities daily, encourage attendance and participation, and document same in the medical record   Outcome: Adequate for Discharge  Goal: Complete daily ADLs, including personal hygiene independently, as able  Description: Interventions:  - Observe,  teach, and assist patient with ADLS  -  Monitor and promote a balance of rest/activity, with adequate nutrition and elimination   Outcome: Adequate for Discharge     Problem: Anxiety  Goal: Anxiety is at manageable level  Description: Interventions:  - Assess and monitor patient's anxiety level.   - Monitor for signs and symptoms (heart palpitations, chest pain, shortness of breath, headaches, nausea, feeling jumpy, restlessness, irritable, apprehensive).   - Collaborate with interdisciplinary team and initiate plan and interventions as ordered.  - [place] patient to unit/surroundings  - Explain treatment plan  - Encourage participation in care  - Encourage verbalization of concerns/fears  - Identify coping mechanisms  - Assist in developing anxiety-reducing skills  - Administer/offer alternative therapies  - Limit or eliminate stimulants  Outcome: Adequate for Discharge

## 2024-03-28 NOTE — BH TRANSITION RECORD
Contact Information: If you have any questions, concerns, pended studies, tests and/or procedures, or emergencies regarding your inpatient behavioral health visit. Please contact [place] behavioral [facility] [phone] and ask to speak to a , nurse or physician. A contact is available 24 hours/ 7 days a week at this number.     Summary of Procedures Performed During your Stay:  Below is a list of major procedures performed during your hospital stay and a summary of results:  - No major procedures performed.    Pending Studies (From admission, onward)      None          Please follow up on the above pending studies with your PCP and/or referring provider.

## 2024-03-28 NOTE — NURSING NOTE
Pt expressed readiness for discharge. AVS reviewed with pt. Medications handed to pt upon d/c. CM picked up at [facility] pharm. Denies any question or concerns. Pt discharge from unit via [facility] transport.
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
## 2024-03-28 NOTE — NURSING NOTE
Keli requested medication for anxiety at this time. Given atarax 100mg PO. Keen score 25. She reports anxiety r/t waiting for discharge.

## 2024-03-28 NOTE — PROGRESS NOTES
03/28/24 0750   Team Meeting   Meeting Type Daily Rounds   Team Members Present   Team Members Present Physician;Nurse;;Other (Discipline and Name)   Physician Team Member Dr. Maldonado / Dr. Marcos / MARTY Tate / PA Student   Nursing Team Member Aakash   Care Management Team Member Donald / Ashley / Edwige   Other (Discipline and Name) Marilu (Group Facilitator)   Patient/Family Present   Patient Present No   Patient's Family Present No       Status: Pt is pleasant and cooperative. Pt is denying SI, HI, and AVH. Pt slept through the night and is ready for discharge.     Medication: Pt received PRN Atarax for severe anxiety.     D/C: Pt is scheduled to discharge today to return to her residence. Pt is being discharged with [facility] to go to [facility].

## 2024-03-28 NOTE — PROGRESS NOTES
03/28/24 0935   Activity/Group Checklist   Group Admission/Discharge  (Relapse prevention plan)   Attendance Attended   Attendance Duration (min) 0-15   Interactions Interacted appropriately   Affect/Mood Appropriate   Goals Achieved   (Completed relapse prevention plan with assistance from this writer. Identified warning signs, coping strategies, and supports. Discussed resources for relapse prevention and management.)

## 2024-03-31 NOTE — PATIENT COMMUNICATION
Form is complete. Placed in [facility]  bin.
Physical form received on 10/18/23  to be completed by PCP. Copy made and placed in PCP folder. Forms to be delivered to PCP mailbox at assigned time.
English

## 2025-04-02 ENCOUNTER — TELEPHONE (OUTPATIENT)
Dept: FAMILY MEDICINE CLINIC | Facility: CLINIC | Age: 40
End: 2025-04-02

## 2025-05-10 NOTE — ED NOTES
"Pt is a 38 y.o. female who was brought to the ED with   Chief Complaint   Patient presents with    Psychiatric Evaluation     Pt arrives wanting to sign herself in for BH for depression and worsening mental state.  Pt says she has not been on bipolar medication in over a year and that she feels like she \"doesn't wanna be here anymore\".  Pt denies any active SI/HI/AH/VH.  Reports increased alcohol use the last few weeks and last did cocaine appr. 2 days ago.      Intake Assessment completed, Safety risk Assessment completed  Pt will be admitted to [facility]      Amy Marino      Patient is a 38 year old female with a past medical history of major depressive disorder anxiety disorder, bipolar, substance and alcohol abuse and sleep difficulties presenting to the emergency department due to worsening depression, fleeting passive SI without a plan and non-compliance with medication. During assessment patient was calm cooperative and pleasant. Plus patient reports family issues and social difficulties as potential recent triggers. 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 Patient appears to have good insight and is requesting inpatient behavioral health treatment to receive assistance in getting back on her behavioral health medications and seek out additional therapies and counseling. Patient has a daughter who will be cared for by family while patient is in treatment. Patient has a history of si, substance and alcohol abuse but is currently denying concern with those substances now. Patient is in agreement with a 201 commitment as well as attending an agreement with current plan.    "
Assumed care for pt. Pt currently sleeping with no s/s of distress observed. Continues on virtual 1:1 for safety.      Gina Amezquita RN  03/25/24 0800    
Assumed care of pt at this time. Pt resting with no signs of distress noted. Virtual obs continues.      Merlene Bradford RN  03/24/24 0312    
Crisis at bedside     Keena Hirsch RN  03/24/24 7043    
Insurance Authorization for admission:   Phone call placed to [facility]   Phone number: [phone]  Spoke to Riya hernadez.  Level of care: 201   Authorization #    Policy termed- 5/21/2023  Call reference #[id number]  
Patient is accepted at [facility]   Patient is accepted by Karol Trimble     Transportation is arranged with [employer].     Transportation is scheduled for  1545 with [facility]         Nurse report is to be called to [phone]  prior to patient transfer.         
Psych at bedside.      Gina Amezquita RN  03/25/24 1394    
Pt requested phone to make a phone call. Phone Provided.      Bailey Bruner RN  03/24/24 1037    
Pt requesting medication for anxiety. Provider made aware.      Gina Amezquita RN  03/25/24 6493    
Pt requesting to use the phone, one provided.      Thomas J Ruzicka, REDDY  03/24/24 5030    
Pt resting on stretcher with no s/s of distress observed.      Gina Amezquita RN  03/25/24 2115    
Received report from previous shift RN. Pt currently sleeping with non-labored respirations noted. Q15 minute checks as well as virtual monitoring in place for safety. Will continue to monitor.      Bailey Bruner RN  03/24/24 07    
Report given to Karol at [facility]     Gina Amezquita RN  03/25/24 2663    
[place]: no MA beds  [facility]: no answer x2  [place]: no MA beds (only accepting [facility] currently)  [facility]: no MA beds  [place]: no MA beds  [place]: no MA beds  [place]: patient not appropriate for their MA bed (not accepting [place] MA)  [place]: no answer x2  [place]: no MA beds, encourages [facility] to follow up after 0800 3/25 for potential discharges  
Toiletries and new safety scrubs provided.     Gina Amezquita RN  03/25/24 4231    
Antibiotics

## 2025-07-03 ENCOUNTER — HOSPITAL ENCOUNTER (EMERGENCY)
Facility: HOSPITAL | Age: 40
Discharge: HOME | End: 2025-07-03
Attending: EMERGENCY MEDICINE
Payer: COMMERCIAL

## 2025-07-03 ENCOUNTER — APPOINTMENT (EMERGENCY)
Dept: ULTRASOUND IMAGING | Facility: HOSPITAL | Age: 40
End: 2025-07-03
Payer: COMMERCIAL

## 2025-07-03 VITALS
WEIGHT: 111.6 LBS | RESPIRATION RATE: 17 BRPM | SYSTOLIC BLOOD PRESSURE: 143 MMHG | BODY MASS INDEX: 19.77 KG/M2 | HEART RATE: 70 BPM | TEMPERATURE: 98.8 F | OXYGEN SATURATION: 100 % | DIASTOLIC BLOOD PRESSURE: 81 MMHG

## 2025-07-03 DIAGNOSIS — O20.0 THREATENED MISCARRIAGE IN EARLY PREGNANCY: Primary | ICD-10-CM

## 2025-07-03 DIAGNOSIS — R10.2 PELVIC PAIN AFFECTING PREGNANCY: ICD-10-CM

## 2025-07-03 DIAGNOSIS — O20.9 VAGINAL BLEEDING AFFECTING EARLY PREGNANCY: ICD-10-CM

## 2025-07-03 DIAGNOSIS — O26.899 PELVIC PAIN AFFECTING PREGNANCY: ICD-10-CM

## 2025-07-03 DIAGNOSIS — N39.0 UTI (URINARY TRACT INFECTION): ICD-10-CM

## 2025-07-03 LAB
ABO GROUP BLD: NORMAL
ALBUMIN SERPL BCG-MCNC: 4.4 G/DL (ref 3.5–5)
ALP SERPL-CCNC: 64 U/L (ref 34–104)
ALT SERPL W P-5'-P-CCNC: 15 U/L (ref 7–52)
ANION GAP SERPL CALCULATED.3IONS-SCNC: 10 MMOL/L (ref 4–13)
APTT PPP: 28 SECONDS (ref 23–34)
AST SERPL W P-5'-P-CCNC: 21 U/L (ref 13–39)
B-HCG SERPL-ACNC: ABNORMAL MIU/ML (ref 0–5)
BACTERIA UR QL AUTO: ABNORMAL /HPF
BASOPHILS # BLD AUTO: 0.09 THOUSANDS/ÂΜL (ref 0–0.1)
BASOPHILS NFR BLD AUTO: 1 % (ref 0–1)
BILIRUB SERPL-MCNC: 0.74 MG/DL (ref 0.2–1)
BILIRUB UR QL STRIP: NEGATIVE
BLD GP AB SCN SERPL QL: NEGATIVE
BUN SERPL-MCNC: 11 MG/DL (ref 5–25)
CALCIUM SERPL-MCNC: 9.3 MG/DL (ref 8.4–10.2)
CHLORIDE SERPL-SCNC: 100 MMOL/L (ref 96–108)
CLARITY UR: ABNORMAL
CO2 SERPL-SCNC: 27 MMOL/L (ref 21–32)
COLOR UR: ABNORMAL
CREAT SERPL-MCNC: 0.68 MG/DL (ref 0.6–1.3)
EOSINOPHIL # BLD AUTO: 0.31 THOUSAND/ÂΜL (ref 0–0.61)
EOSINOPHIL NFR BLD AUTO: 3 % (ref 0–6)
ERYTHROCYTE [DISTWIDTH] IN BLOOD BY AUTOMATED COUNT: 14.8 % (ref 11.6–15.1)
EXT PREGNANCY TEST URINE: POSITIVE
EXT. CONTROL: ABNORMAL
GFR SERPL CREATININE-BSD FRML MDRD: 108 ML/MIN/1.73SQ M
GLUCOSE SERPL-MCNC: 90 MG/DL (ref 65–140)
GLUCOSE UR STRIP-MCNC: NEGATIVE MG/DL
HCT VFR BLD AUTO: 38.4 % (ref 34.8–46.1)
HGB BLD-MCNC: 12.2 G/DL (ref 11.5–15.4)
HGB UR QL STRIP.AUTO: 150
IMM GRANULOCYTES # BLD AUTO: 0.02 THOUSAND/UL (ref 0–0.2)
IMM GRANULOCYTES NFR BLD AUTO: 0 % (ref 0–2)
INR PPP: 1.16 (ref 0.85–1.19)
KETONES UR STRIP-MCNC: ABNORMAL MG/DL
LEUKOCYTE ESTERASE UR QL STRIP: 100
LYMPHOCYTES # BLD AUTO: 3.03 THOUSANDS/ÂΜL (ref 0.6–4.47)
LYMPHOCYTES NFR BLD AUTO: 32 % (ref 14–44)
MCH RBC QN AUTO: 26 PG (ref 26.8–34.3)
MCHC RBC AUTO-ENTMCNC: 31.8 G/DL (ref 31.4–37.4)
MCV RBC AUTO: 82 FL (ref 82–98)
MONOCYTES # BLD AUTO: 0.86 THOUSAND/ÂΜL (ref 0.17–1.22)
MONOCYTES NFR BLD AUTO: 9 % (ref 4–12)
MUCOUS THREADS UR QL AUTO: ABNORMAL
NEUTROPHILS # BLD AUTO: 5.3 THOUSANDS/ÂΜL (ref 1.85–7.62)
NEUTS SEG NFR BLD AUTO: 55 % (ref 43–75)
NITRITE UR QL STRIP: POSITIVE
NON-SQ EPI CELLS URNS QL MICRO: ABNORMAL /HPF
NRBC BLD AUTO-RTO: 0 /100 WBCS
PH UR STRIP.AUTO: 6.5 [PH]
PLATELET # BLD AUTO: 441 THOUSANDS/UL (ref 149–390)
PMV BLD AUTO: 8.8 FL (ref 8.9–12.7)
POTASSIUM SERPL-SCNC: 3.6 MMOL/L (ref 3.5–5.3)
PROT SERPL-MCNC: 7.3 G/DL (ref 6.4–8.4)
PROT UR STRIP-MCNC: ABNORMAL MG/DL
PROTHROMBIN TIME: 15.1 SECONDS (ref 12.3–15)
RBC # BLD AUTO: 4.69 MILLION/UL (ref 3.81–5.12)
RBC #/AREA URNS AUTO: ABNORMAL /HPF
RH BLD: POSITIVE
SODIUM SERPL-SCNC: 137 MMOL/L (ref 135–147)
SP GR UR STRIP.AUTO: 1.01 (ref 1–1.04)
SPECIMEN EXPIRATION DATE: NORMAL
UROBILINOGEN UA: 4 MG/DL
WBC # BLD AUTO: 9.61 THOUSAND/UL (ref 4.31–10.16)
WBC #/AREA URNS AUTO: ABNORMAL /HPF

## 2025-07-03 PROCEDURE — 85025 COMPLETE CBC W/AUTO DIFF WBC: CPT

## 2025-07-03 PROCEDURE — 85610 PROTHROMBIN TIME: CPT

## 2025-07-03 PROCEDURE — 76815 OB US LIMITED FETUS(S): CPT

## 2025-07-03 PROCEDURE — 85730 THROMBOPLASTIN TIME PARTIAL: CPT

## 2025-07-03 PROCEDURE — 87186 SC STD MICRODIL/AGAR DIL: CPT

## 2025-07-03 PROCEDURE — 36415 COLL VENOUS BLD VENIPUNCTURE: CPT

## 2025-07-03 PROCEDURE — 80053 COMPREHEN METABOLIC PANEL: CPT

## 2025-07-03 PROCEDURE — 96360 HYDRATION IV INFUSION INIT: CPT

## 2025-07-03 PROCEDURE — 87077 CULTURE AEROBIC IDENTIFY: CPT

## 2025-07-03 PROCEDURE — 87086 URINE CULTURE/COLONY COUNT: CPT

## 2025-07-03 PROCEDURE — 99285 EMERGENCY DEPT VISIT HI MDM: CPT

## 2025-07-03 PROCEDURE — 86850 RBC ANTIBODY SCREEN: CPT

## 2025-07-03 PROCEDURE — 96361 HYDRATE IV INFUSION ADD-ON: CPT

## 2025-07-03 PROCEDURE — 86900 BLOOD TYPING SEROLOGIC ABO: CPT

## 2025-07-03 PROCEDURE — 86901 BLOOD TYPING SEROLOGIC RH(D): CPT

## 2025-07-03 PROCEDURE — 84702 CHORIONIC GONADOTROPIN TEST: CPT

## 2025-07-03 PROCEDURE — 81001 URINALYSIS AUTO W/SCOPE: CPT

## 2025-07-03 PROCEDURE — 81025 URINE PREGNANCY TEST: CPT

## 2025-07-03 PROCEDURE — 99284 EMERGENCY DEPT VISIT MOD MDM: CPT

## 2025-07-03 RX ORDER — ACETAMINOPHEN 325 MG/1
975 TABLET ORAL ONCE
Status: COMPLETED | OUTPATIENT
Start: 2025-07-03 | End: 2025-07-03

## 2025-07-03 RX ORDER — CEPHALEXIN 500 MG/1
500 CAPSULE ORAL EVERY 6 HOURS SCHEDULED
Qty: 28 CAPSULE | Refills: 0 | Status: SHIPPED | OUTPATIENT
Start: 2025-07-03 | End: 2025-07-10

## 2025-07-03 RX ORDER — CEPHALEXIN 500 MG/1
500 CAPSULE ORAL ONCE
Status: COMPLETED | OUTPATIENT
Start: 2025-07-03 | End: 2025-07-03

## 2025-07-03 RX ADMIN — CEPHALEXIN 500 MG: 500 CAPSULE ORAL at 03:24

## 2025-07-03 RX ADMIN — ACETAMINOPHEN 975 MG: 325 TABLET ORAL at 03:24

## 2025-07-03 RX ADMIN — SODIUM CHLORIDE 1000 ML: 0.9 INJECTION, SOLUTION INTRAVENOUS at 02:37
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
## 2025-07-04 ENCOUNTER — RESULTS FOLLOW-UP (OUTPATIENT)
Dept: EMERGENCY DEPT | Facility: HOSPITAL | Age: 40
End: 2025-07-04

## 2025-07-04 DIAGNOSIS — O20.0 THREATENED MISCARRIAGE IN EARLY PREGNANCY: Primary | ICD-10-CM

## 2025-07-05 LAB — BACTERIA UR CULT: ABNORMAL

## 2025-07-05 NOTE — RESULT ENCOUNTER NOTE
Per chart review discharged on cephalexin, susceptible to same class.  No antibiotic change necessary at this time.  No further action from ED.

## 2025-07-08 ENCOUNTER — APPOINTMENT (OUTPATIENT)
Dept: LAB | Facility: HOSPITAL | Age: 40
End: 2025-07-08
Payer: COMMERCIAL

## 2025-07-08 DIAGNOSIS — O20.0 THREATENED MISCARRIAGE IN EARLY PREGNANCY: ICD-10-CM

## 2025-07-08 DIAGNOSIS — R10.2 PELVIC PAIN AFFECTING PREGNANCY: ICD-10-CM

## 2025-07-08 DIAGNOSIS — O26.899 PELVIC PAIN AFFECTING PREGNANCY: ICD-10-CM

## 2025-07-08 DIAGNOSIS — O20.9 VAGINAL BLEEDING AFFECTING EARLY PREGNANCY: ICD-10-CM

## 2025-07-08 LAB — B-HCG SERPL-ACNC: 1114.7 MIU/ML (ref 0–5)

## 2025-07-08 PROCEDURE — 36415 COLL VENOUS BLD VENIPUNCTURE: CPT

## 2025-07-08 PROCEDURE — 84702 CHORIONIC GONADOTROPIN TEST: CPT

## 2025-07-16 NOTE — RESULT ENCOUNTER NOTE
Unable to contact patient. Letter sent. Per chart review, ambulatory referral to obgyn had been closed, new one placed.

## 2025-07-30 PROBLEM — Z32.01 ENCOUNTER FOR PREGNANCY TEST, RESULT POSITIVE: Status: ACTIVE | Noted: 2025-07-30

## 2025-07-31 ENCOUNTER — TELEPHONE (OUTPATIENT)
Dept: OBGYN CLINIC | Facility: CLINIC | Age: 40
End: 2025-07-31